# Patient Record
Sex: MALE | Race: WHITE | NOT HISPANIC OR LATINO | Employment: FULL TIME | URBAN - METROPOLITAN AREA
[De-identification: names, ages, dates, MRNs, and addresses within clinical notes are randomized per-mention and may not be internally consistent; named-entity substitution may affect disease eponyms.]

---

## 2021-01-29 ENCOUNTER — OFFICE VISIT (OUTPATIENT)
Dept: PRIMARY CARE CLINIC | Facility: CLINIC | Age: 21
End: 2021-01-29
Payer: COMMERCIAL

## 2021-01-29 ENCOUNTER — LAB VISIT (OUTPATIENT)
Dept: LAB | Facility: HOSPITAL | Age: 21
End: 2021-01-29
Attending: PHYSICIAN ASSISTANT
Payer: COMMERCIAL

## 2021-01-29 VITALS
HEART RATE: 81 BPM | WEIGHT: 175.5 LBS | DIASTOLIC BLOOD PRESSURE: 80 MMHG | SYSTOLIC BLOOD PRESSURE: 110 MMHG | TEMPERATURE: 98 F | OXYGEN SATURATION: 99 %

## 2021-01-29 DIAGNOSIS — Z00.00 WELLNESS EXAMINATION: ICD-10-CM

## 2021-01-29 DIAGNOSIS — F41.9 ANXIETY: Primary | ICD-10-CM

## 2021-01-29 DIAGNOSIS — F41.9 ANXIETY: ICD-10-CM

## 2021-01-29 DIAGNOSIS — R41.840 ATTENTION DEFICIT: ICD-10-CM

## 2021-01-29 LAB
BASOPHILS # BLD AUTO: 0.02 K/UL (ref 0–0.2)
BASOPHILS NFR BLD: 0.4 % (ref 0–1.9)
DIFFERENTIAL METHOD: NORMAL
EOSINOPHIL # BLD AUTO: 0 K/UL (ref 0–0.5)
EOSINOPHIL NFR BLD: 0.7 % (ref 0–8)
ERYTHROCYTE [DISTWIDTH] IN BLOOD BY AUTOMATED COUNT: 12.8 % (ref 11.5–14.5)
HCT VFR BLD AUTO: 43.9 % (ref 40–54)
HGB BLD-MCNC: 14.3 G/DL (ref 14–18)
IMM GRANULOCYTES # BLD AUTO: 0 K/UL (ref 0–0.04)
IMM GRANULOCYTES NFR BLD AUTO: 0 % (ref 0–0.5)
LYMPHOCYTES # BLD AUTO: 1.8 K/UL (ref 1–4.8)
LYMPHOCYTES NFR BLD: 33.7 % (ref 18–48)
MCH RBC QN AUTO: 30.6 PG (ref 27–31)
MCHC RBC AUTO-ENTMCNC: 32.6 G/DL (ref 32–36)
MCV RBC AUTO: 94 FL (ref 82–98)
MONOCYTES # BLD AUTO: 0.5 K/UL (ref 0.3–1)
MONOCYTES NFR BLD: 10 % (ref 4–15)
NEUTROPHILS # BLD AUTO: 3 K/UL (ref 1.8–7.7)
NEUTROPHILS NFR BLD: 55.2 % (ref 38–73)
NRBC BLD-RTO: 0 /100 WBC
PLATELET # BLD AUTO: 239 K/UL (ref 150–350)
PMV BLD AUTO: 10.8 FL (ref 9.2–12.9)
RBC # BLD AUTO: 4.68 M/UL (ref 4.6–6.2)
WBC # BLD AUTO: 5.4 K/UL (ref 3.9–12.7)

## 2021-01-29 PROCEDURE — 84443 ASSAY THYROID STIM HORMONE: CPT

## 2021-01-29 PROCEDURE — 80061 LIPID PANEL: CPT

## 2021-01-29 PROCEDURE — 86703 HIV-1/HIV-2 1 RESULT ANTBDY: CPT

## 2021-01-29 PROCEDURE — 99999 PR PBB SHADOW E&M-NEW PATIENT-LVL III: CPT | Mod: PBBFAC,,, | Performed by: PHYSICIAN ASSISTANT

## 2021-01-29 PROCEDURE — 86803 HEPATITIS C AB TEST: CPT

## 2021-01-29 PROCEDURE — 80053 COMPREHEN METABOLIC PANEL: CPT

## 2021-01-29 PROCEDURE — 99203 OFFICE O/P NEW LOW 30 MIN: CPT | Mod: S$GLB,,, | Performed by: PHYSICIAN ASSISTANT

## 2021-01-29 PROCEDURE — 1126F PR PAIN SEVERITY QUANTIFIED, NO PAIN PRESENT: ICD-10-PCS | Mod: S$GLB,,, | Performed by: PHYSICIAN ASSISTANT

## 2021-01-29 PROCEDURE — 85025 COMPLETE CBC W/AUTO DIFF WBC: CPT

## 2021-01-29 PROCEDURE — 99203 PR OFFICE/OUTPT VISIT, NEW, LEVL III, 30-44 MIN: ICD-10-PCS | Mod: S$GLB,,, | Performed by: PHYSICIAN ASSISTANT

## 2021-01-29 PROCEDURE — 36415 COLL VENOUS BLD VENIPUNCTURE: CPT | Mod: PN

## 2021-01-29 PROCEDURE — 1126F AMNT PAIN NOTED NONE PRSNT: CPT | Mod: S$GLB,,, | Performed by: PHYSICIAN ASSISTANT

## 2021-01-29 PROCEDURE — 99999 PR PBB SHADOW E&M-NEW PATIENT-LVL III: ICD-10-PCS | Mod: PBBFAC,,, | Performed by: PHYSICIAN ASSISTANT

## 2021-01-30 LAB
ALBUMIN SERPL BCP-MCNC: 4.6 G/DL (ref 3.5–5.2)
ALP SERPL-CCNC: 52 U/L (ref 55–135)
ALT SERPL W/O P-5'-P-CCNC: 14 U/L (ref 10–44)
ANION GAP SERPL CALC-SCNC: 8 MMOL/L (ref 8–16)
AST SERPL-CCNC: 23 U/L (ref 10–40)
BILIRUB SERPL-MCNC: 1.1 MG/DL (ref 0.1–1)
BUN SERPL-MCNC: 8 MG/DL (ref 6–20)
CALCIUM SERPL-MCNC: 9.6 MG/DL (ref 8.7–10.5)
CHLORIDE SERPL-SCNC: 106 MMOL/L (ref 95–110)
CHOLEST SERPL-MCNC: 129 MG/DL (ref 120–199)
CHOLEST/HDLC SERPL: 2.2 {RATIO} (ref 2–5)
CO2 SERPL-SCNC: 27 MMOL/L (ref 23–29)
CREAT SERPL-MCNC: 1.1 MG/DL (ref 0.5–1.4)
EST. GFR  (AFRICAN AMERICAN): >60 ML/MIN/1.73 M^2
EST. GFR  (NON AFRICAN AMERICAN): >60 ML/MIN/1.73 M^2
GLUCOSE SERPL-MCNC: 85 MG/DL (ref 70–110)
HDLC SERPL-MCNC: 59 MG/DL (ref 40–75)
HDLC SERPL: 45.7 % (ref 20–50)
LDLC SERPL CALC-MCNC: 58.6 MG/DL (ref 63–159)
NONHDLC SERPL-MCNC: 70 MG/DL
POTASSIUM SERPL-SCNC: 4.7 MMOL/L (ref 3.5–5.1)
PROT SERPL-MCNC: 7.2 G/DL (ref 6–8.4)
SODIUM SERPL-SCNC: 141 MMOL/L (ref 136–145)
TRIGL SERPL-MCNC: 57 MG/DL (ref 30–150)
TSH SERPL DL<=0.005 MIU/L-ACNC: 0.48 UIU/ML (ref 0.4–4)

## 2021-02-01 LAB
HCV AB SERPL QL IA: NEGATIVE
HIV 1+2 AB+HIV1 P24 AG SERPL QL IA: NEGATIVE

## 2021-02-03 ENCOUNTER — OFFICE VISIT (OUTPATIENT)
Dept: PRIMARY CARE CLINIC | Facility: CLINIC | Age: 21
End: 2021-02-03
Payer: COMMERCIAL

## 2021-02-03 DIAGNOSIS — R41.840 ATTENTION DEFICIT: Primary | ICD-10-CM

## 2021-02-03 DIAGNOSIS — F41.9 ANXIETY: ICD-10-CM

## 2021-02-03 PROCEDURE — 99214 PR OFFICE/OUTPT VISIT, EST, LEVL IV, 30-39 MIN: ICD-10-PCS | Mod: 95,,, | Performed by: FAMILY MEDICINE

## 2021-02-03 PROCEDURE — 99214 OFFICE O/P EST MOD 30 MIN: CPT | Mod: 95,,, | Performed by: FAMILY MEDICINE

## 2021-02-03 RX ORDER — METHYLPHENIDATE HYDROCHLORIDE 27 MG/1
27 TABLET ORAL EVERY MORNING
Qty: 30 TABLET | Refills: 0 | Status: SHIPPED | OUTPATIENT
Start: 2021-02-03 | End: 2021-02-11

## 2021-02-11 ENCOUNTER — OFFICE VISIT (OUTPATIENT)
Dept: PRIMARY CARE CLINIC | Facility: CLINIC | Age: 21
End: 2021-02-11
Payer: COMMERCIAL

## 2021-02-11 DIAGNOSIS — F41.9 ANXIETY: ICD-10-CM

## 2021-02-11 DIAGNOSIS — F98.8 ATTENTION DEFICIT DISORDER, UNSPECIFIED HYPERACTIVITY PRESENCE: Primary | ICD-10-CM

## 2021-02-11 PROCEDURE — 99214 PR OFFICE/OUTPT VISIT, EST, LEVL IV, 30-39 MIN: ICD-10-PCS | Mod: 95,,, | Performed by: FAMILY MEDICINE

## 2021-02-11 PROCEDURE — 99214 OFFICE O/P EST MOD 30 MIN: CPT | Mod: 95,,, | Performed by: FAMILY MEDICINE

## 2021-02-11 RX ORDER — METHYLPHENIDATE HYDROCHLORIDE 27 MG/1
27 TABLET ORAL 2 TIMES DAILY
Qty: 30 TABLET | Refills: 0
Start: 2021-02-11 | End: 2021-02-23

## 2021-02-23 RX ORDER — METHYLPHENIDATE HYDROCHLORIDE 27 MG/1
27 TABLET ORAL 2 TIMES DAILY
Qty: 30 TABLET | Refills: 0 | Status: CANCELLED
Start: 2021-02-23

## 2021-02-23 RX ORDER — METHYLPHENIDATE HYDROCHLORIDE 54 MG/1
54 TABLET ORAL EVERY MORNING
Qty: 30 TABLET | Refills: 0 | Status: SHIPPED | OUTPATIENT
Start: 2021-02-23 | End: 2021-03-18 | Stop reason: SDUPTHER

## 2021-03-03 ENCOUNTER — OFFICE VISIT (OUTPATIENT)
Dept: PRIMARY CARE CLINIC | Facility: CLINIC | Age: 21
End: 2021-03-03
Payer: COMMERCIAL

## 2021-03-03 DIAGNOSIS — R41.840 ATTENTION AND CONCENTRATION DEFICIT: Primary | ICD-10-CM

## 2021-03-03 DIAGNOSIS — F15.20 CAFFEINE ADDICTION: ICD-10-CM

## 2021-03-03 DIAGNOSIS — F41.9 ANXIETY: ICD-10-CM

## 2021-03-03 PROCEDURE — 99214 OFFICE O/P EST MOD 30 MIN: CPT | Mod: 95,,, | Performed by: FAMILY MEDICINE

## 2021-03-03 PROCEDURE — 99214 PR OFFICE/OUTPT VISIT, EST, LEVL IV, 30-39 MIN: ICD-10-PCS | Mod: 95,,, | Performed by: FAMILY MEDICINE

## 2021-03-18 ENCOUNTER — OFFICE VISIT (OUTPATIENT)
Dept: PRIMARY CARE CLINIC | Facility: CLINIC | Age: 21
End: 2021-03-18
Payer: COMMERCIAL

## 2021-03-18 DIAGNOSIS — R41.840 ATTENTION AND CONCENTRATION DEFICIT: Primary | ICD-10-CM

## 2021-03-18 PROCEDURE — 99214 OFFICE O/P EST MOD 30 MIN: CPT | Mod: 95,,, | Performed by: FAMILY MEDICINE

## 2021-03-18 PROCEDURE — 99214 PR OFFICE/OUTPT VISIT, EST, LEVL IV, 30-39 MIN: ICD-10-PCS | Mod: 95,,, | Performed by: FAMILY MEDICINE

## 2021-03-18 RX ORDER — METHYLPHENIDATE HYDROCHLORIDE 27 MG/1
27 TABLET ORAL DAILY
Qty: 30 TABLET | Refills: 0 | Status: SHIPPED | OUTPATIENT
Start: 2021-03-18 | End: 2021-04-16 | Stop reason: SDUPTHER

## 2021-03-18 RX ORDER — METHYLPHENIDATE HYDROCHLORIDE 54 MG/1
54 TABLET ORAL EVERY MORNING
Qty: 30 TABLET | Refills: 0 | Status: SHIPPED | OUTPATIENT
Start: 2021-03-18 | End: 2021-04-16 | Stop reason: SDUPTHER

## 2021-04-16 RX ORDER — METHYLPHENIDATE HYDROCHLORIDE 27 MG/1
27 TABLET ORAL DAILY
Qty: 30 TABLET | Refills: 0 | Status: SHIPPED | OUTPATIENT
Start: 2021-04-16 | End: 2021-05-19 | Stop reason: SDUPTHER

## 2021-04-16 RX ORDER — METHYLPHENIDATE HYDROCHLORIDE 54 MG/1
54 TABLET ORAL EVERY MORNING
Qty: 30 TABLET | Refills: 0 | Status: SHIPPED | OUTPATIENT
Start: 2021-04-16 | End: 2021-05-19 | Stop reason: SDUPTHER

## 2021-04-19 ENCOUNTER — OFFICE VISIT (OUTPATIENT)
Dept: PRIMARY CARE CLINIC | Facility: CLINIC | Age: 21
End: 2021-04-19
Payer: COMMERCIAL

## 2021-04-19 DIAGNOSIS — R41.840 ATTENTION AND CONCENTRATION DEFICIT: Primary | ICD-10-CM

## 2021-04-19 DIAGNOSIS — F41.9 ANXIETY: ICD-10-CM

## 2021-04-19 PROCEDURE — 99214 OFFICE O/P EST MOD 30 MIN: CPT | Mod: 95,,, | Performed by: FAMILY MEDICINE

## 2021-04-19 PROCEDURE — 99214 PR OFFICE/OUTPT VISIT, EST, LEVL IV, 30-39 MIN: ICD-10-PCS | Mod: 95,,, | Performed by: FAMILY MEDICINE

## 2021-04-19 RX ORDER — FLUOXETINE 10 MG/1
10 CAPSULE ORAL DAILY
Qty: 30 CAPSULE | Refills: 2 | Status: SHIPPED | OUTPATIENT
Start: 2021-04-19 | End: 2021-07-30 | Stop reason: SDUPTHER

## 2021-04-20 ENCOUNTER — PATIENT MESSAGE (OUTPATIENT)
Dept: PSYCHIATRY | Facility: CLINIC | Age: 21
End: 2021-04-20

## 2021-05-20 RX ORDER — METHYLPHENIDATE HYDROCHLORIDE 54 MG/1
54 TABLET ORAL EVERY MORNING
Qty: 30 TABLET | Refills: 0 | Status: SHIPPED | OUTPATIENT
Start: 2021-05-20 | End: 2021-06-24 | Stop reason: SDUPTHER

## 2021-05-20 RX ORDER — METHYLPHENIDATE HYDROCHLORIDE 27 MG/1
27 TABLET ORAL DAILY
Qty: 30 TABLET | Refills: 0 | Status: SHIPPED | OUTPATIENT
Start: 2021-05-20 | End: 2021-06-24 | Stop reason: SDUPTHER

## 2021-05-27 ENCOUNTER — OFFICE VISIT (OUTPATIENT)
Dept: PRIMARY CARE CLINIC | Facility: CLINIC | Age: 21
End: 2021-05-27
Payer: COMMERCIAL

## 2021-05-27 DIAGNOSIS — R41.840 ATTENTION AND CONCENTRATION DEFICIT: Primary | ICD-10-CM

## 2021-05-27 DIAGNOSIS — F41.1 GAD (GENERALIZED ANXIETY DISORDER): ICD-10-CM

## 2021-05-27 PROCEDURE — 99214 PR OFFICE/OUTPT VISIT, EST, LEVL IV, 30-39 MIN: ICD-10-PCS | Mod: 95,,, | Performed by: FAMILY MEDICINE

## 2021-05-27 PROCEDURE — 99214 OFFICE O/P EST MOD 30 MIN: CPT | Mod: 95,,, | Performed by: FAMILY MEDICINE

## 2021-06-24 RX ORDER — METHYLPHENIDATE HYDROCHLORIDE 54 MG/1
54 TABLET ORAL EVERY MORNING
Qty: 30 TABLET | Refills: 0 | Status: SHIPPED | OUTPATIENT
Start: 2021-06-24 | End: 2021-07-30 | Stop reason: SDUPTHER

## 2021-06-24 RX ORDER — METHYLPHENIDATE HYDROCHLORIDE 27 MG/1
27 TABLET ORAL DAILY
Qty: 30 TABLET | Refills: 0 | Status: SHIPPED | OUTPATIENT
Start: 2021-06-24 | End: 2021-07-30 | Stop reason: SDUPTHER

## 2021-07-30 ENCOUNTER — TELEPHONE (OUTPATIENT)
Dept: PRIMARY CARE CLINIC | Facility: CLINIC | Age: 21
End: 2021-07-30

## 2021-07-30 RX ORDER — METHYLPHENIDATE HYDROCHLORIDE 54 MG/1
54 TABLET ORAL EVERY MORNING
Qty: 30 TABLET | Refills: 0 | Status: SHIPPED | OUTPATIENT
Start: 2021-07-30 | End: 2021-09-09 | Stop reason: SDUPTHER

## 2021-07-30 RX ORDER — FLUOXETINE 10 MG/1
10 CAPSULE ORAL DAILY
Qty: 30 CAPSULE | Refills: 2 | Status: SHIPPED | OUTPATIENT
Start: 2021-07-30 | End: 2021-09-09

## 2021-07-30 RX ORDER — METHYLPHENIDATE HYDROCHLORIDE 27 MG/1
27 TABLET ORAL DAILY
Qty: 30 TABLET | Refills: 0 | Status: SHIPPED | OUTPATIENT
Start: 2021-07-30 | End: 2021-09-09 | Stop reason: SDUPTHER

## 2021-09-09 ENCOUNTER — OFFICE VISIT (OUTPATIENT)
Dept: PRIMARY CARE CLINIC | Facility: CLINIC | Age: 21
End: 2021-09-09
Payer: COMMERCIAL

## 2021-09-09 VITALS — DIASTOLIC BLOOD PRESSURE: 60 MMHG | SYSTOLIC BLOOD PRESSURE: 119 MMHG

## 2021-09-09 DIAGNOSIS — F41.1 GAD (GENERALIZED ANXIETY DISORDER): ICD-10-CM

## 2021-09-09 DIAGNOSIS — R41.840 ATTENTION AND CONCENTRATION DEFICIT: Primary | ICD-10-CM

## 2021-09-09 DIAGNOSIS — R45.84 ANHEDONIA: ICD-10-CM

## 2021-09-09 PROCEDURE — 3078F PR MOST RECENT DIASTOLIC BLOOD PRESSURE < 80 MM HG: ICD-10-PCS | Mod: CPTII,,, | Performed by: FAMILY MEDICINE

## 2021-09-09 PROCEDURE — 1160F PR REVIEW ALL MEDS BY PRESCRIBER/CLIN PHARMACIST DOCUMENTED: ICD-10-PCS | Mod: CPTII,,, | Performed by: FAMILY MEDICINE

## 2021-09-09 PROCEDURE — 99214 PR OFFICE/OUTPT VISIT, EST, LEVL IV, 30-39 MIN: ICD-10-PCS | Mod: 95,,, | Performed by: FAMILY MEDICINE

## 2021-09-09 PROCEDURE — 99214 OFFICE O/P EST MOD 30 MIN: CPT | Mod: 95,,, | Performed by: FAMILY MEDICINE

## 2021-09-09 PROCEDURE — 1159F MED LIST DOCD IN RCRD: CPT | Mod: CPTII,,, | Performed by: FAMILY MEDICINE

## 2021-09-09 PROCEDURE — 3078F DIAST BP <80 MM HG: CPT | Mod: CPTII,,, | Performed by: FAMILY MEDICINE

## 2021-09-09 PROCEDURE — 3074F SYST BP LT 130 MM HG: CPT | Mod: CPTII,,, | Performed by: FAMILY MEDICINE

## 2021-09-09 PROCEDURE — 1160F RVW MEDS BY RX/DR IN RCRD: CPT | Mod: CPTII,,, | Performed by: FAMILY MEDICINE

## 2021-09-09 PROCEDURE — 3074F PR MOST RECENT SYSTOLIC BLOOD PRESSURE < 130 MM HG: ICD-10-PCS | Mod: CPTII,,, | Performed by: FAMILY MEDICINE

## 2021-09-09 PROCEDURE — 1159F PR MEDICATION LIST DOCUMENTED IN MEDICAL RECORD: ICD-10-PCS | Mod: CPTII,,, | Performed by: FAMILY MEDICINE

## 2021-09-09 RX ORDER — METHYLPHENIDATE HYDROCHLORIDE 54 MG/1
54 TABLET ORAL EVERY MORNING
Qty: 30 TABLET | Refills: 0 | Status: SHIPPED | OUTPATIENT
Start: 2021-09-09 | End: 2021-10-11 | Stop reason: SDUPTHER

## 2021-09-09 RX ORDER — METHYLPHENIDATE HYDROCHLORIDE 27 MG/1
27 TABLET ORAL DAILY
Qty: 30 TABLET | Refills: 0 | Status: SHIPPED | OUTPATIENT
Start: 2021-09-09 | End: 2021-10-11 | Stop reason: SDUPTHER

## 2021-09-09 RX ORDER — SERTRALINE HYDROCHLORIDE 25 MG/1
25 TABLET, FILM COATED ORAL DAILY
Qty: 90 TABLET | Refills: 3 | Status: SHIPPED | OUTPATIENT
Start: 2021-09-09 | End: 2022-03-23

## 2021-10-12 RX ORDER — METHYLPHENIDATE HYDROCHLORIDE 27 MG/1
27 TABLET ORAL DAILY
Qty: 30 TABLET | Refills: 0 | Status: SHIPPED | OUTPATIENT
Start: 2021-10-12 | End: 2021-11-08 | Stop reason: SDUPTHER

## 2021-10-12 RX ORDER — METHYLPHENIDATE HYDROCHLORIDE 54 MG/1
54 TABLET ORAL EVERY MORNING
Qty: 30 TABLET | Refills: 0 | Status: SHIPPED | OUTPATIENT
Start: 2021-10-12 | End: 2021-11-08 | Stop reason: SDUPTHER

## 2021-11-08 RX ORDER — METHYLPHENIDATE HYDROCHLORIDE 27 MG/1
27 TABLET ORAL DAILY
Qty: 30 TABLET | Refills: 0 | Status: SHIPPED | OUTPATIENT
Start: 2021-11-08 | End: 2021-12-15 | Stop reason: SDUPTHER

## 2021-11-08 RX ORDER — METHYLPHENIDATE HYDROCHLORIDE 54 MG/1
54 TABLET ORAL EVERY MORNING
Qty: 30 TABLET | Refills: 0 | Status: SHIPPED | OUTPATIENT
Start: 2021-11-08 | End: 2021-12-15 | Stop reason: SDUPTHER

## 2021-12-09 RX ORDER — METHYLPHENIDATE HYDROCHLORIDE 54 MG/1
54 TABLET ORAL EVERY MORNING
Qty: 30 TABLET | Refills: 0 | OUTPATIENT
Start: 2021-12-09

## 2021-12-09 RX ORDER — METHYLPHENIDATE HYDROCHLORIDE 27 MG/1
27 TABLET ORAL DAILY
Qty: 30 TABLET | Refills: 0 | OUTPATIENT
Start: 2021-12-09

## 2021-12-14 RX ORDER — METHYLPHENIDATE HYDROCHLORIDE 27 MG/1
27 TABLET ORAL DAILY
Qty: 30 TABLET | Refills: 0 | OUTPATIENT
Start: 2021-12-14

## 2021-12-14 RX ORDER — METHYLPHENIDATE HYDROCHLORIDE 54 MG/1
54 TABLET ORAL EVERY MORNING
Qty: 30 TABLET | Refills: 0 | OUTPATIENT
Start: 2021-12-14

## 2021-12-15 ENCOUNTER — OFFICE VISIT (OUTPATIENT)
Dept: PRIMARY CARE CLINIC | Facility: CLINIC | Age: 21
End: 2021-12-15
Payer: COMMERCIAL

## 2021-12-15 ENCOUNTER — PATIENT MESSAGE (OUTPATIENT)
Dept: PRIMARY CARE CLINIC | Facility: CLINIC | Age: 21
End: 2021-12-15

## 2021-12-15 VITALS — SYSTOLIC BLOOD PRESSURE: 126 MMHG | DIASTOLIC BLOOD PRESSURE: 60 MMHG

## 2021-12-15 DIAGNOSIS — F41.1 GAD (GENERALIZED ANXIETY DISORDER): ICD-10-CM

## 2021-12-15 DIAGNOSIS — F32.A DEPRESSION, UNSPECIFIED DEPRESSION TYPE: ICD-10-CM

## 2021-12-15 DIAGNOSIS — R41.840 ATTENTION AND CONCENTRATION DEFICIT: Primary | ICD-10-CM

## 2021-12-15 DIAGNOSIS — Z00.00 ROUTINE GENERAL MEDICAL EXAMINATION AT A HEALTH CARE FACILITY: ICD-10-CM

## 2021-12-15 PROCEDURE — 99214 OFFICE O/P EST MOD 30 MIN: CPT | Mod: 95,,, | Performed by: FAMILY MEDICINE

## 2021-12-15 PROCEDURE — 99214 PR OFFICE/OUTPT VISIT, EST, LEVL IV, 30-39 MIN: ICD-10-PCS | Mod: 95,,, | Performed by: FAMILY MEDICINE

## 2021-12-15 RX ORDER — METHYLPHENIDATE HYDROCHLORIDE 54 MG/1
54 TABLET ORAL EVERY MORNING
Qty: 30 TABLET | Refills: 0 | Status: SHIPPED | OUTPATIENT
Start: 2021-12-15 | End: 2022-01-13 | Stop reason: SDUPTHER

## 2021-12-15 RX ORDER — METHYLPHENIDATE HYDROCHLORIDE 27 MG/1
27 TABLET ORAL DAILY
Qty: 30 TABLET | Refills: 0 | Status: CANCELLED | OUTPATIENT
Start: 2021-12-15

## 2021-12-15 RX ORDER — METHYLPHENIDATE HYDROCHLORIDE 27 MG/1
27 TABLET ORAL DAILY
Qty: 30 TABLET | Refills: 0 | Status: SHIPPED | OUTPATIENT
Start: 2021-12-15 | End: 2022-01-13 | Stop reason: SDUPTHER

## 2021-12-15 RX ORDER — METHYLPHENIDATE HYDROCHLORIDE 54 MG/1
54 TABLET ORAL EVERY MORNING
Qty: 30 TABLET | Refills: 0 | Status: CANCELLED | OUTPATIENT
Start: 2021-12-15

## 2022-01-13 RX ORDER — METHYLPHENIDATE HYDROCHLORIDE 54 MG/1
54 TABLET ORAL EVERY MORNING
Qty: 30 TABLET | Refills: 0 | Status: SHIPPED | OUTPATIENT
Start: 2022-01-13 | End: 2022-02-14 | Stop reason: SDUPTHER

## 2022-01-13 RX ORDER — METHYLPHENIDATE HYDROCHLORIDE 27 MG/1
27 TABLET ORAL DAILY
Qty: 30 TABLET | Refills: 0 | Status: SHIPPED | OUTPATIENT
Start: 2022-01-13 | End: 2022-02-14 | Stop reason: SDUPTHER

## 2022-01-13 NOTE — TELEPHONE ENCOUNTER
No new care gaps identified.  Powered by [employer]. Reference number: [id number].   1/13/2022 9:02:23 AM CST

## 2022-02-14 RX ORDER — METHYLPHENIDATE HYDROCHLORIDE 54 MG/1
54 TABLET ORAL EVERY MORNING
Qty: 30 TABLET | Refills: 0 | Status: SHIPPED | OUTPATIENT
Start: 2022-02-14 | End: 2022-03-23 | Stop reason: SDUPTHER

## 2022-02-14 RX ORDER — METHYLPHENIDATE HYDROCHLORIDE 27 MG/1
27 TABLET ORAL DAILY
Qty: 30 TABLET | Refills: 0 | Status: SHIPPED | OUTPATIENT
Start: 2022-02-14 | End: 2022-03-23 | Stop reason: SDUPTHER

## 2022-02-14 NOTE — TELEPHONE ENCOUNTER
No new care gaps identified.  Powered by [employer]. Reference number: [id number].   2/14/2022 11:23:19 AM CST

## 2022-03-09 ENCOUNTER — LAB VISIT (OUTPATIENT)
Dept: LAB | Facility: HOSPITAL | Age: 22
End: 2022-03-09
Attending: FAMILY MEDICINE
Payer: COMMERCIAL

## 2022-03-09 DIAGNOSIS — Z00.00 ROUTINE GENERAL MEDICAL EXAMINATION AT A HEALTH CARE FACILITY: ICD-10-CM

## 2022-03-09 LAB
ALBUMIN SERPL BCP-MCNC: 4.3 G/DL (ref 3.5–5.2)
ALP SERPL-CCNC: 53 U/L (ref 55–135)
ALT SERPL W/O P-5'-P-CCNC: 37 U/L (ref 10–44)
ANION GAP SERPL CALC-SCNC: 13 MMOL/L (ref 8–16)
AST SERPL-CCNC: 28 U/L (ref 10–40)
BILIRUB SERPL-MCNC: 0.7 MG/DL (ref 0.1–1)
BUN SERPL-MCNC: 8 MG/DL (ref 6–20)
CALCIUM SERPL-MCNC: 9.4 MG/DL (ref 8.7–10.5)
CHLORIDE SERPL-SCNC: 101 MMOL/L (ref 95–110)
CHOLEST SERPL-MCNC: 153 MG/DL (ref 120–199)
CHOLEST/HDLC SERPL: 2.8 {RATIO} (ref 2–5)
CO2 SERPL-SCNC: 26 MMOL/L (ref 23–29)
CREAT SERPL-MCNC: 1.1 MG/DL (ref 0.5–1.4)
EST. GFR  (AFRICAN AMERICAN): >60 ML/MIN/1.73 M^2
EST. GFR  (NON AFRICAN AMERICAN): >60 ML/MIN/1.73 M^2
GLUCOSE SERPL-MCNC: 92 MG/DL (ref 70–110)
HDLC SERPL-MCNC: 55 MG/DL (ref 40–75)
HDLC SERPL: 35.9 % (ref 20–50)
LDLC SERPL CALC-MCNC: 67.8 MG/DL (ref 63–159)
NONHDLC SERPL-MCNC: 98 MG/DL
POTASSIUM SERPL-SCNC: 4.1 MMOL/L (ref 3.5–5.1)
PROT SERPL-MCNC: 7.2 G/DL (ref 6–8.4)
SODIUM SERPL-SCNC: 140 MMOL/L (ref 136–145)
TRIGL SERPL-MCNC: 151 MG/DL (ref 30–150)

## 2022-03-09 PROCEDURE — 36415 COLL VENOUS BLD VENIPUNCTURE: CPT | Mod: PN | Performed by: FAMILY MEDICINE

## 2022-03-09 PROCEDURE — 80053 COMPREHEN METABOLIC PANEL: CPT | Performed by: FAMILY MEDICINE

## 2022-03-09 PROCEDURE — 80061 LIPID PANEL: CPT | Performed by: FAMILY MEDICINE

## 2022-03-17 NOTE — PROGRESS NOTES
Ginger,     Your lab results are within recommended goals for your age and conditions. No change in therapy is needed. Please let me know if you have further questions.    Sincerely,   Abby Woodall MD

## 2022-03-23 ENCOUNTER — OFFICE VISIT (OUTPATIENT)
Dept: PRIMARY CARE CLINIC | Facility: CLINIC | Age: 22
End: 2022-03-23
Payer: COMMERCIAL

## 2022-03-23 VITALS
HEART RATE: 89 BPM | WEIGHT: 221.25 LBS | TEMPERATURE: 98 F | OXYGEN SATURATION: 99 % | DIASTOLIC BLOOD PRESSURE: 70 MMHG | BODY MASS INDEX: 29.97 KG/M2 | RESPIRATION RATE: 18 BRPM | HEIGHT: 72 IN | SYSTOLIC BLOOD PRESSURE: 120 MMHG

## 2022-03-23 DIAGNOSIS — Z00.00 ROUTINE GENERAL MEDICAL EXAMINATION AT A HEALTH CARE FACILITY: Primary | ICD-10-CM

## 2022-03-23 DIAGNOSIS — F32.A DEPRESSION, UNSPECIFIED DEPRESSION TYPE: ICD-10-CM

## 2022-03-23 DIAGNOSIS — F41.1 GAD (GENERALIZED ANXIETY DISORDER): ICD-10-CM

## 2022-03-23 DIAGNOSIS — R41.840 ATTENTION AND CONCENTRATION DEFICIT: ICD-10-CM

## 2022-03-23 PROCEDURE — 99395 PREV VISIT EST AGE 18-39: CPT | Mod: S$GLB,,, | Performed by: FAMILY MEDICINE

## 2022-03-23 PROCEDURE — 1159F PR MEDICATION LIST DOCUMENTED IN MEDICAL RECORD: ICD-10-PCS | Mod: CPTII,S$GLB,, | Performed by: FAMILY MEDICINE

## 2022-03-23 PROCEDURE — 99999 PR PBB SHADOW E&M-EST. PATIENT-LVL III: ICD-10-PCS | Mod: PBBFAC,,, | Performed by: FAMILY MEDICINE

## 2022-03-23 PROCEDURE — 99395 PR PREVENTIVE VISIT,EST,18-39: ICD-10-PCS | Mod: S$GLB,,, | Performed by: FAMILY MEDICINE

## 2022-03-23 PROCEDURE — 93010 EKG 12-LEAD: ICD-10-PCS | Mod: S$GLB,,, | Performed by: INTERNAL MEDICINE

## 2022-03-23 PROCEDURE — 3074F SYST BP LT 130 MM HG: CPT | Mod: CPTII,S$GLB,, | Performed by: FAMILY MEDICINE

## 2022-03-23 PROCEDURE — 3078F DIAST BP <80 MM HG: CPT | Mod: CPTII,S$GLB,, | Performed by: FAMILY MEDICINE

## 2022-03-23 PROCEDURE — 93005 ELECTROCARDIOGRAM TRACING: CPT | Mod: S$GLB,,, | Performed by: FAMILY MEDICINE

## 2022-03-23 PROCEDURE — 1159F MED LIST DOCD IN RCRD: CPT | Mod: CPTII,S$GLB,, | Performed by: FAMILY MEDICINE

## 2022-03-23 PROCEDURE — 3074F PR MOST RECENT SYSTOLIC BLOOD PRESSURE < 130 MM HG: ICD-10-PCS | Mod: CPTII,S$GLB,, | Performed by: FAMILY MEDICINE

## 2022-03-23 PROCEDURE — 93005 EKG 12-LEAD: ICD-10-PCS | Mod: S$GLB,,, | Performed by: FAMILY MEDICINE

## 2022-03-23 PROCEDURE — 93010 ELECTROCARDIOGRAM REPORT: CPT | Mod: S$GLB,,, | Performed by: INTERNAL MEDICINE

## 2022-03-23 PROCEDURE — 99999 PR PBB SHADOW E&M-EST. PATIENT-LVL III: CPT | Mod: PBBFAC,,, | Performed by: FAMILY MEDICINE

## 2022-03-23 PROCEDURE — 3008F BODY MASS INDEX DOCD: CPT | Mod: CPTII,S$GLB,, | Performed by: FAMILY MEDICINE

## 2022-03-23 PROCEDURE — 3008F PR BODY MASS INDEX (BMI) DOCUMENTED: ICD-10-PCS | Mod: CPTII,S$GLB,, | Performed by: FAMILY MEDICINE

## 2022-03-23 PROCEDURE — 3078F PR MOST RECENT DIASTOLIC BLOOD PRESSURE < 80 MM HG: ICD-10-PCS | Mod: CPTII,S$GLB,, | Performed by: FAMILY MEDICINE

## 2022-03-23 RX ORDER — METHYLPHENIDATE HYDROCHLORIDE 54 MG/1
54 TABLET ORAL EVERY MORNING
Qty: 30 TABLET | Refills: 0 | Status: SHIPPED | OUTPATIENT
Start: 2022-03-23 | End: 2022-04-26 | Stop reason: SDUPTHER

## 2022-03-23 RX ORDER — SERTRALINE HYDROCHLORIDE 50 MG/1
50 TABLET, FILM COATED ORAL DAILY
Qty: 90 TABLET | Refills: 3 | Status: SHIPPED | OUTPATIENT
Start: 2022-03-23 | End: 2023-02-14 | Stop reason: SDUPTHER

## 2022-03-23 RX ORDER — METHYLPHENIDATE HYDROCHLORIDE 27 MG/1
27 TABLET ORAL DAILY
Qty: 30 TABLET | Refills: 0 | Status: SHIPPED | OUTPATIENT
Start: 2022-03-23 | End: 2022-04-26 | Stop reason: SDUPTHER

## 2022-03-23 NOTE — PROGRESS NOTES
Subjective:      Patient ID: Ginger Matamoros is a 21 y.o. male.    Chief Complaint: Annual Exam (Yearly visit)    Disclaimer:  This note is prepared using voice recognition software and as such is likely to have errors and has not been proof read. Please contact me for questions.     Ginger Matamoros is a 21 y.o. male who presents to clinic for ADD/anxiety.   Had a lot more stress, and a busier time of year. Feel sbetter than 1 yr ago.   Everyday there is something different.   No chest pains.   Sometimes with working out with get some chest tightness with lifting weights.   If skips  A day of sertraline will not be a pleasant person the day after. Went through a break up and made him sad.     No results found for: HGBA1C   Lab Results       Component                Value               Date                       CHOL                     153                 03/09/2022                 CHOL                     129                 01/29/2021            Lab Results       Component                Value               Date                       LDLCALC                  67.8                03/09/2022                 LDLCALC                  58.6 (L)            01/29/2021              Wt Readings from Last 10 Encounters:  03/23/22 : 100.4 kg (221 lb 3.7 oz)  01/29/21 : 79.6 kg (175 lb 7.8 oz)      The ASCVD Risk score (Waterfordlinda MANZANO Jr., et al., 2013) failed to calculate for the following reasons:    The 2013 ASCVD risk score is only valid for ages 40 to 79    :   03/04/2022 02/14/2022   1  Methylphenidate Er 27 Mg Tab   30.00  30  Me Lov  [id number]  Och ([id number])  0   [place]    02/14/2022 02/14/2022   1  Methylphenidate Er 54 Mg Tab   30.00  30  Me Lov  [id number]  Och ([id number])  [place]   [place]    02/01/2022 01/13/2022   1  Methylphenidate Er 27 Mg Tab   30.00  30  Me Lov  [phone]  Och ([id number])  [place]   [place]    01/13/2022 01/13/2022   1  Methylphenidate Er 54 Mg Tab   30.00  30  [facility]  [phone]  Och ([id number])   0   [place]    12/17/2021  12/15/2021   1  Methylphenidate Er 54 Mg Tab   30.00  30  Me Lov  [phone]  Och ([id number])  0   [place]    12/15/2021  12/15/2021   1  Methylphenidate Er 27 Mg Tab   30.00  30  Me Lov  [id number]  Och ([id number])  0   [place]    11/16/2021 11/08/2021   1  Methylphenidate Er 54 Mg Tab   30.00  30  Me Lov  [id number]  Och ([id number])  0   [place]    11/11/2021 11/08/2021   1  Methylphenidate Er 27 Mg Tab   30.00  30  Me Lov  [phone]  Och ([id number])  0   Comm Malinda LUNA         Has a birthday yesterday. Is working currently during the visit. At [employer].   Adhd- is doing well. Didn't take meds today because out of them and did need visit.   Does like the sertraline to help with the anxiety. Handles things better as well.   No ha, no cp, did have sore throat, cough and mucous production last week with loss of voice at [employer] but better now.   No fever.   Plans on getting flu and covid vaccines soon.     BP: 126/60      reviewed.   Past Medical History:  Brady, ADHD (attention deficit hyperactivity disorder)        Lab Results   Component Value Date    WBC 5.40 01/29/2021    HGB 14.3 01/29/2021    HCT 43.9 01/29/2021     01/29/2021    CHOL 153 03/09/2022    TRIG 151 (H) 03/09/2022    HDL 55 03/09/2022    ALT 37 03/09/2022    AST 28 03/09/2022     03/09/2022    K 4.1 03/09/2022     03/09/2022    CREATININE 1.1 03/09/2022    BUN 8 03/09/2022    CO2 26 03/09/2022    TSH 0.478 01/29/2021       No image results found.        Review of Systems   Constitutional: Negative for activity change and unexpected weight change.   HENT: Negative for hearing loss, rhinorrhea and trouble swallowing.    Eyes: Negative for discharge and visual disturbance.   Respiratory: Negative for chest tightness and wheezing.    Cardiovascular: Negative for chest pain and palpitations.   Gastrointestinal: Negative for blood in stool, constipation, diarrhea and vomiting.    Endocrine: Negative for polydipsia and polyuria.   Genitourinary: Negative for difficulty urinating, hematuria and urgency.   Musculoskeletal: Negative for arthralgias, joint swelling and neck pain.   Neurological: Negative for weakness and headaches.   Psychiatric/Behavioral: Negative for confusion and dysphoric mood.     Objective:     Vitals:    03/23/22 1420   BP: 120/70   Pulse: 89   Resp: 18   Temp: 98 °F (36.7 °C)   TempSrc: Temporal   SpO2: 99%   Weight: 100.4 kg (221 lb 3.7 oz)   Height: 6' (1.829 m)     Physical Exam  Vitals reviewed.   Constitutional:       General: He is not in acute distress.     Appearance: Normal appearance. He is well-developed and normal weight.   HENT:      Head: Normocephalic and atraumatic.      Right Ear: Tympanic membrane and external ear normal.      Left Ear: Tympanic membrane and external ear normal.      Nose: Nose normal.      Mouth/Throat:      Mouth: Mucous membranes are moist.      Pharynx: Oropharynx is clear.   Eyes:      Conjunctiva/sclera: Conjunctivae normal.      Pupils: Pupils are equal, round, and reactive to light.   Neck:      Thyroid: No thyromegaly.   Cardiovascular:      Rate and Rhythm: Normal rate and regular rhythm.      Heart sounds: No murmur heard.    No friction rub. No gallop.   Pulmonary:      Effort: Pulmonary effort is normal. No respiratory distress.      Breath sounds: Normal breath sounds.   Abdominal:      General: Bowel sounds are normal. There is no distension.      Palpations: Abdomen is soft.      Tenderness: There is no abdominal tenderness. There is no rebound.   Musculoskeletal:         General: Normal range of motion.      Cervical back: Normal range of motion and neck supple.   Lymphadenopathy:      Cervical: No cervical adenopathy.   Skin:     General: Skin is warm and dry.   Neurological:      General: No focal deficit present.      Mental Status: He is alert and oriented to person, place, and time.      Coordination:  Coordination normal.   Psychiatric:         Attention and Perception: Attention normal.         Mood and Affect: Mood and affect normal.         Speech: Speech normal.         Behavior: Behavior normal.         Thought Content: Thought content normal.         Cognition and Memory: Cognition normal.         Judgment: Judgment normal.       Assessment:     1. Routine general medical examination at a health care facility    2. Attention and concentration deficit    3. MEKA (generalized anxiety disorder)    4. Depression, unspecified depression type      Plan:   Ginger was seen today for annual exam.    Diagnoses and all orders for this visit:    Routine general medical examination at a health care facility - labs ordered. Discussed Health Maintenance issues.       Attention and concentration deficit- ekg today  reviewed. See note, refilled meds.   -     EKG 12-lead    MEKA (generalized anxiety disorder)- not controlled, worse, increase sertraline from 25 to 50mg daily. followup in 3 months virtually.     Depression, unspecified depression type- stable      Other orders  -     sertraline (ZOLOFT) 50 MG tablet; Take 1 tablet (50 mg total) by mouth once daily.  -     methylphenidate HCl 54 MG CR tablet; Take 1 tablet (54 mg total) by mouth every morning.  -     methylphenidate HCl 27 MG CR tablet; Take 1 tablet (27 mg total) by mouth once daily. Mid-day dosing for extending dosing for long work days            Follow up in about 3 months (around 6/23/2022) for f/u Virtual Visit, f/u Virtual for ADD.    There are no Patient Instructions on file for this visit.

## 2022-04-26 RX ORDER — METHYLPHENIDATE HYDROCHLORIDE 54 MG/1
54 TABLET ORAL EVERY MORNING
Qty: 30 TABLET | Refills: 0 | Status: SHIPPED | OUTPATIENT
Start: 2022-04-26 | End: 2022-05-31 | Stop reason: SDUPTHER

## 2022-04-26 RX ORDER — METHYLPHENIDATE HYDROCHLORIDE 27 MG/1
27 TABLET ORAL DAILY
Qty: 30 TABLET | Refills: 0 | Status: SHIPPED | OUTPATIENT
Start: 2022-04-26 | End: 2022-05-31 | Stop reason: SDUPTHER

## 2022-04-26 NOTE — TELEPHONE ENCOUNTER
No new care gaps identified.  Powered by [employer]. Reference number: [id number].   4/26/2022 9:13:17 AM CDT

## 2022-05-31 RX ORDER — METHYLPHENIDATE HYDROCHLORIDE 27 MG/1
27 TABLET ORAL DAILY
Qty: 30 TABLET | Refills: 0 | Status: SHIPPED | OUTPATIENT
Start: 2022-05-31 | End: 2022-06-23 | Stop reason: SDUPTHER

## 2022-05-31 RX ORDER — METHYLPHENIDATE HYDROCHLORIDE 54 MG/1
54 TABLET ORAL EVERY MORNING
Qty: 30 TABLET | Refills: 0 | Status: SHIPPED | OUTPATIENT
Start: 2022-05-31 | End: 2022-06-23 | Stop reason: SDUPTHER

## 2022-05-31 NOTE — TELEPHONE ENCOUNTER
No new care gaps identified.  [facility] Embedded Care Gaps. Reference number: [id number]. 5/31/2022   3:04:33 PM CDT

## 2022-06-23 ENCOUNTER — OFFICE VISIT (OUTPATIENT)
Dept: PRIMARY CARE CLINIC | Facility: CLINIC | Age: 22
End: 2022-06-23
Payer: COMMERCIAL

## 2022-06-23 DIAGNOSIS — F41.1 GAD (GENERALIZED ANXIETY DISORDER): Primary | ICD-10-CM

## 2022-06-23 DIAGNOSIS — R41.840 ATTENTION AND CONCENTRATION DEFICIT: ICD-10-CM

## 2022-06-23 DIAGNOSIS — F32.A DEPRESSION, UNSPECIFIED DEPRESSION TYPE: ICD-10-CM

## 2022-06-23 PROCEDURE — 1159F PR MEDICATION LIST DOCUMENTED IN MEDICAL RECORD: ICD-10-PCS | Mod: CPTII,95,, | Performed by: FAMILY MEDICINE

## 2022-06-23 PROCEDURE — 1160F PR REVIEW ALL MEDS BY PRESCRIBER/CLIN PHARMACIST DOCUMENTED: ICD-10-PCS | Mod: CPTII,95,, | Performed by: FAMILY MEDICINE

## 2022-06-23 PROCEDURE — 1160F RVW MEDS BY RX/DR IN RCRD: CPT | Mod: CPTII,95,, | Performed by: FAMILY MEDICINE

## 2022-06-23 PROCEDURE — 1159F MED LIST DOCD IN RCRD: CPT | Mod: CPTII,95,, | Performed by: FAMILY MEDICINE

## 2022-06-23 PROCEDURE — 99214 PR OFFICE/OUTPT VISIT, EST, LEVL IV, 30-39 MIN: ICD-10-PCS | Mod: 95,,, | Performed by: FAMILY MEDICINE

## 2022-06-23 PROCEDURE — 99214 OFFICE O/P EST MOD 30 MIN: CPT | Mod: 95,,, | Performed by: FAMILY MEDICINE

## 2022-06-23 RX ORDER — METHYLPHENIDATE HYDROCHLORIDE 54 MG/1
54 TABLET ORAL EVERY MORNING
Qty: 30 TABLET | Refills: 0 | Status: SHIPPED | OUTPATIENT
Start: 2022-06-29 | End: 2022-07-26 | Stop reason: SDUPTHER

## 2022-06-23 RX ORDER — METHYLPHENIDATE HYDROCHLORIDE 27 MG/1
27 TABLET ORAL DAILY
Qty: 30 TABLET | Refills: 0 | Status: SHIPPED | OUTPATIENT
Start: 2022-06-29 | End: 2022-07-26 | Stop reason: SDUPTHER

## 2022-06-23 NOTE — PROGRESS NOTES
Subjective:      Patient ID: Ginger Matamoros is a 21 y.o. male.    Chief Complaint: Follow-up    Disclaimer:  This note is prepared using voice recognition software and as such is likely to have errors and has not been proof read. Please contact me for questions.     The patient location is: work   The chief complaint leading to consultation is: mychart request     Visit type: video and audio simultaneous    Face to Face time with patient: 340pm -350pm       minutes of total time spent on the encounter, which includes face to face time and non-face to face time preparing to see the patient (eg, review of tests), Obtaining and/or reviewing separately obtained history, Documenting clinical information in the electronic or other health record, Independently interpreting results (not separately reported) and communicating results to the patient/family/caregiver, or Care coordination (not separately reported).     Each patient to whom he or she provides medical services by telemedicine is:  (1) informed of the relationship between the physician and patient and the respective role of any other health care provider with respect to management of the patient; and (2) notified that he or she may decline to receive medical services by telemedicine and may withdraw from such care at any time.    Ginger Matamoros is a 21 y.o. male who presents to clinic for ADD/anxiety.   Feels a lot better after increasing the meds.  Of zoloft to 50mg.   Had a lot more stress, and a busier time of year. Feels better than 1 yr ago.   No chest pains.   works. At [employer].   Does like the sertraline to help with the anxiety. Handles things better as well.   No ha, no cp,    reviewed.   Past Medical History:  Brady, ADHD (attention deficit hyperactivity disorder)      No results found for: HGBA1C   Lab Results   Component Value Date    CHOL 153 03/09/2022    CHOL 129 01/29/2021     Lab Results   Component Value Date    LDLCALC 67.8  03/09/2022    LDLCALC 58.6 (L) 01/29/2021       Wt Readings from Last 10 Encounters:   03/23/22 100.4 kg (221 lb 3.7 oz)   01/29/21 79.6 kg (175 lb 7.8 oz)       The ASCVD Risk score (Joo MANZANO Jr., et al., 2013) failed to calculate for the following reasons:    The 2013 ASCVD risk score is only valid for ages 40 to 79        Lab Results   Component Value Date    WBC 5.40 01/29/2021    HGB 14.3 01/29/2021    HCT 43.9 01/29/2021     01/29/2021    CHOL 153 03/09/2022    TRIG 151 (H) 03/09/2022    HDL 55 03/09/2022    ALT 37 03/09/2022    AST 28 03/09/2022     03/09/2022    K 4.1 03/09/2022     03/09/2022    CREATININE 1.1 03/09/2022    BUN 8 03/09/2022    CO2 26 03/09/2022    TSH 0.478 01/29/2021       No image results found.        Review of Systems   Constitutional: Negative for activity change and unexpected weight change.   HENT: Negative for hearing loss, rhinorrhea and trouble swallowing.    Eyes: Negative for discharge and visual disturbance.   Respiratory: Negative for chest tightness and wheezing.    Cardiovascular: Negative for chest pain and palpitations.   Gastrointestinal: Negative for blood in stool, constipation, diarrhea and vomiting.   Endocrine: Negative for polydipsia and polyuria.   Genitourinary: Negative for difficulty urinating, hematuria and urgency.   Musculoskeletal: Negative for arthralgias, joint swelling and neck pain.   Neurological: Negative for weakness and headaches.   Psychiatric/Behavioral: Negative for confusion and dysphoric mood.     Objective:   There were no vitals filed for this visit.  Physical Exam  Vitals reviewed.   Constitutional:       General: He is awake. He is not in acute distress.     Appearance: Normal appearance. He is well-developed, well-groomed and normal weight. He is not ill-appearing.   HENT:      Head: Normocephalic and atraumatic.      Right Ear: External ear normal.      Left Ear: External ear normal.      Nose: Nose normal.       Mouth/Throat:      Lips: Pink.   Eyes:      Conjunctiva/sclera: Conjunctivae normal.   Pulmonary:      Effort: Pulmonary effort is normal.   Neurological:      Mental Status: He is alert.   Psychiatric:         Attention and Perception: Attention and perception normal. He is attentive.         Mood and Affect: Mood and affect normal. Mood is not anxious or depressed. Affect is not labile, blunt, angry or inappropriate.         Speech: Speech normal. He is communicative. Speech is not rapid and pressured, delayed, slurred or tangential.         Behavior: Behavior normal. Behavior is not agitated, slowed, aggressive, withdrawn, hyperactive or combative. Behavior is cooperative.         Thought Content: Thought content normal. Thought content is not paranoid or delusional. Thought content does not include homicidal or suicidal ideation. Thought content does not include homicidal or suicidal plan.         Cognition and Memory: Cognition and memory normal. Memory is not impaired. He does not exhibit impaired recent memory or impaired remote memory.         Judgment: Judgment normal. Judgment is not impulsive or inappropriate.       Assessment:     1. MEKA (generalized anxiety disorder)    2. Attention and concentration deficit    3. Depression, unspecified depression type      Plan:   Ginger was seen today for follow-up.    Diagnoses and all orders for this visit:    MEKA (generalized anxiety disorder)  Comments:  improved with sertraline 50mg, cont with dosing.     Attention and concentration deficit  Comments:  stable with meds. doing well. no ha, no cp, no mood swings, no bp issues. sent in refills of concerta. f/u 3 mo    Depression, unspecified depression type  Comments:  improved  with sertraline at 50mg dose.     Other orders  -     methylphenidate HCl 54 MG CR tablet; Take 1 tablet (54 mg total) by mouth every morning.  -     methylphenidate HCl 27 MG CR tablet; Take 1 tablet (27 mg total) by mouth once daily.  Mid-day dosing for extending dosing for long work days            Follow up in about 3 months (around 9/23/2022) for f/u Virtual for ADD/ Annette or Abby .    There are no Patient Instructions on file for this visit.

## 2022-07-27 RX ORDER — METHYLPHENIDATE HYDROCHLORIDE 54 MG/1
54 TABLET ORAL EVERY MORNING
Qty: 30 TABLET | Refills: 0 | Status: SHIPPED | OUTPATIENT
Start: 2022-07-27 | End: 2022-09-06 | Stop reason: SDUPTHER

## 2022-07-27 RX ORDER — METHYLPHENIDATE HYDROCHLORIDE 27 MG/1
27 TABLET ORAL DAILY
Qty: 30 TABLET | Refills: 0 | Status: SHIPPED | OUTPATIENT
Start: 2022-07-27 | End: 2022-09-06 | Stop reason: SDUPTHER

## 2022-07-27 NOTE — TELEPHONE ENCOUNTER
No new care gaps identified.  [facility] Embedded Care Gaps. Reference number: [id number]. 7/26/2022   9:10:54 PM CDT

## 2022-09-06 RX ORDER — METHYLPHENIDATE HYDROCHLORIDE 54 MG/1
54 TABLET ORAL EVERY MORNING
Qty: 30 TABLET | Refills: 0 | Status: SHIPPED | OUTPATIENT
Start: 2022-09-06 | End: 2022-10-06 | Stop reason: SDUPTHER

## 2022-09-06 RX ORDER — METHYLPHENIDATE HYDROCHLORIDE 27 MG/1
27 TABLET ORAL DAILY
Qty: 30 TABLET | Refills: 0 | Status: SHIPPED | OUTPATIENT
Start: 2022-09-06 | End: 2022-10-06 | Stop reason: SDUPTHER

## 2022-09-06 NOTE — TELEPHONE ENCOUNTER
Please inform to the patient that 1 prescription was sent and an appointment needs to be made to avoid delays in ADD medication refills for future. Needs to be q 3mo, can be with Abby or Annette. Can send them a scheduling ticket. Thanks. Abby Woodall MD

## 2022-09-06 NOTE — TELEPHONE ENCOUNTER
No new care gaps identified.  [facility] Embedded Care Gaps. Reference number: [id number]. 9/06/2022   10:04:08 AM CATIET

## 2022-10-06 NOTE — TELEPHONE ENCOUNTER
No new care gaps identified.  [facility] Embedded Care Gaps. Reference number: [id number]. 10/06/2022   3:56:40 PM CDT

## 2022-10-07 RX ORDER — METHYLPHENIDATE HYDROCHLORIDE 54 MG/1
54 TABLET ORAL EVERY MORNING
Qty: 30 TABLET | Refills: 0 | Status: SHIPPED | OUTPATIENT
Start: 2022-10-07 | End: 2022-11-15 | Stop reason: SDUPTHER

## 2022-10-07 RX ORDER — METHYLPHENIDATE HYDROCHLORIDE 27 MG/1
27 TABLET ORAL DAILY
Qty: 30 TABLET | Refills: 0 | Status: SHIPPED | OUTPATIENT
Start: 2022-10-07 | End: 2022-11-15 | Stop reason: SDUPTHER

## 2022-10-25 ENCOUNTER — PATIENT MESSAGE (OUTPATIENT)
Dept: PRIMARY CARE CLINIC | Facility: CLINIC | Age: 22
End: 2022-10-25

## 2022-10-25 ENCOUNTER — OFFICE VISIT (OUTPATIENT)
Dept: PRIMARY CARE CLINIC | Facility: CLINIC | Age: 22
End: 2022-10-25
Payer: COMMERCIAL

## 2022-10-25 VITALS — DIASTOLIC BLOOD PRESSURE: 80 MMHG | SYSTOLIC BLOOD PRESSURE: 120 MMHG

## 2022-10-25 DIAGNOSIS — F41.1 GAD (GENERALIZED ANXIETY DISORDER): ICD-10-CM

## 2022-10-25 DIAGNOSIS — R41.840 ATTENTION AND CONCENTRATION DEFICIT: Primary | ICD-10-CM

## 2022-10-25 DIAGNOSIS — F32.A DEPRESSION, UNSPECIFIED DEPRESSION TYPE: ICD-10-CM

## 2022-10-25 PROCEDURE — 1159F PR MEDICATION LIST DOCUMENTED IN MEDICAL RECORD: ICD-10-PCS | Mod: CPTII,95,, | Performed by: FAMILY MEDICINE

## 2022-10-25 PROCEDURE — 99214 OFFICE O/P EST MOD 30 MIN: CPT | Mod: 95,,, | Performed by: FAMILY MEDICINE

## 2022-10-25 PROCEDURE — 1160F RVW MEDS BY RX/DR IN RCRD: CPT | Mod: CPTII,95,, | Performed by: FAMILY MEDICINE

## 2022-10-25 PROCEDURE — 3079F DIAST BP 80-89 MM HG: CPT | Mod: CPTII,95,, | Performed by: FAMILY MEDICINE

## 2022-10-25 PROCEDURE — 1159F MED LIST DOCD IN RCRD: CPT | Mod: CPTII,95,, | Performed by: FAMILY MEDICINE

## 2022-10-25 PROCEDURE — 3074F PR MOST RECENT SYSTOLIC BLOOD PRESSURE < 130 MM HG: ICD-10-PCS | Mod: CPTII,95,, | Performed by: FAMILY MEDICINE

## 2022-10-25 PROCEDURE — 99214 PR OFFICE/OUTPT VISIT, EST, LEVL IV, 30-39 MIN: ICD-10-PCS | Mod: 95,,, | Performed by: FAMILY MEDICINE

## 2022-10-25 PROCEDURE — 1160F PR REVIEW ALL MEDS BY PRESCRIBER/CLIN PHARMACIST DOCUMENTED: ICD-10-PCS | Mod: CPTII,95,, | Performed by: FAMILY MEDICINE

## 2022-10-25 PROCEDURE — 3074F SYST BP LT 130 MM HG: CPT | Mod: CPTII,95,, | Performed by: FAMILY MEDICINE

## 2022-10-25 PROCEDURE — 3079F PR MOST RECENT DIASTOLIC BLOOD PRESSURE 80-89 MM HG: ICD-10-PCS | Mod: CPTII,95,, | Performed by: FAMILY MEDICINE

## 2022-10-25 NOTE — PROGRESS NOTES
Subjective:      Patient ID: Ginger Matamoros is a 21 y.o. male.    Chief Complaint: ADD    Disclaimer:  This note is prepared using voice recognition software and as such is likely to have errors and has not been proof read. Please contact me for questions.     [facility] Reason For Visit    10/25/2022  2:56 PM CDT - Filed by Patient   What is your primary reason for visit? Other/Annual   Have you experienced any of the following:   Change in activity? No   Unexpected weight change? No   Neck pain? No   Hearing loss? No   Runny nose? No   Trouble swallowing? No   Eye discharge? No   Changes in vision? No   Chest tightness? No   Wheezing? No   Chest pain? No   Heart beating fast or racing? No   Blood in stool? No   Constipation? No   Vomiting? No   Diarrhea? No   Drinking much more than usual? No   Urinating much more than usual? No   Difficulty urinating? No   Have a sudden urge to urinate? No   Blood in the urine? No   Joint swelling? No   Joint pain? No   Headaches? No   Weakness? No   Confusion? No   Feeling depressed? No     [facility] Peq Documents    10/25/2022  2:57 PM CDT - Filed by Patient   Would you like a copy of [facility] Financial [facility] Policy Summary? No, I would not like a copy.   Would you like to receive more information regarding your rights and protections under the [place] Billing act? No, I would not.       The patient location is: work   The chief complaint leading to consultation is: mychart request     Visit type: video and audio simultaneous    Face to Face time with patient: 320pm -328pm      15  minutes of total time spent on the encounter, which includes face to face time and non-face to face time preparing to see the patient (eg, review of tests), Obtaining and/or reviewing separately obtained history, Documenting clinical information in the electronic or other health record, Independently interpreting results (not separately reported) and communicating results to the  patient/family/caregiver, or Care coordination (not separately reported).     Each patient to whom he or she provides medical services by telemedicine is:  (1) informed of the relationship between the physician and patient and the respective role of any other health care provider with respect to management of the patient; and (2) notified that he or she may decline to receive medical services by telemedicine and may withdraw from such care at any time.    Ginger Matamoros is a 21 y.o. male who presents to clinic for ADD/anxiety.   Feels a lot better after increasing the meds.  Of zoloft to 50mg. Can tell a big improvement with anxiety.   Stress is still present.    At work today.   Thinks things are better.   works [employer].   Does like the sertraline to help with the anxiety. Handles things better as well.     On Concerta 54 and 27mg but not taking every day. Has plenty.   No ha, no cp,    reviewed.   Past Medical History:  Brady, ADHD (attention deficit hyperactivity disorder)            No results found for: HGBA1C   Lab Results   Component Value Date    CHOL 153 03/09/2022    CHOL 129 01/29/2021     Lab Results   Component Value Date    LDLCALC 67.8 03/09/2022    LDLCALC 58.6 (L) 01/29/2021       Wt Readings from Last 10 Encounters:   03/23/22 100.4 kg (221 lb 3.7 oz)   01/29/21 79.6 kg (175 lb 7.8 oz)       The ASCVD Risk score (Edward DK, et al., 2019) failed to calculate for the following reasons:    The 2019 ASCVD risk score is only valid for ages 40 to 79  Lab Results   Component Value Date    WBC 5.40 01/29/2021    HGB 14.3 01/29/2021    HCT 43.9 01/29/2021     01/29/2021    CHOL 153 03/09/2022    TRIG 151 (H) 03/09/2022    HDL 55 03/09/2022    ALT 37 03/09/2022    AST 28 03/09/2022     03/09/2022    K 4.1 03/09/2022     03/09/2022    CREATININE 1.1 03/09/2022    BUN 8 03/09/2022    CO2 26 03/09/2022    TSH 0.478 01/29/2021       No image results found.        Review of  Systems   Constitutional:  Negative for activity change and unexpected weight change.   HENT:  Negative for hearing loss, rhinorrhea and trouble swallowing.    Eyes:  Negative for discharge and visual disturbance.   Respiratory:  Negative for chest tightness and wheezing.    Cardiovascular:  Negative for chest pain and palpitations.   Gastrointestinal:  Negative for blood in stool, constipation, diarrhea and vomiting.   Endocrine: Negative for polydipsia and polyuria.   Genitourinary:  Negative for difficulty urinating, hematuria and urgency.   Musculoskeletal:  Negative for arthralgias, joint swelling and neck pain.   Neurological:  Negative for weakness and headaches.   Psychiatric/Behavioral:  Negative for confusion and dysphoric mood.    Objective:     Vitals:    10/25/22 1526   BP: 120/80     Physical Exam  Vitals reviewed.   Constitutional:       General: He is awake. He is not in acute distress.     Appearance: Normal appearance. He is well-developed, well-groomed and normal weight. He is not ill-appearing.   HENT:      Head: Normocephalic and atraumatic.      Right Ear: External ear normal.      Left Ear: External ear normal.      Nose: Nose normal.      Mouth/Throat:      Lips: Pink.   Eyes:      Conjunctiva/sclera: Conjunctivae normal.   Pulmonary:      Effort: Pulmonary effort is normal.   Neurological:      Mental Status: He is alert.   Psychiatric:         Attention and Perception: Attention and perception normal. He is attentive.         Mood and Affect: Mood and affect normal. Mood is not anxious or depressed. Affect is not labile, blunt, angry or inappropriate.         Speech: Speech normal. He is communicative. Speech is not rapid and pressured, delayed, slurred or tangential.         Behavior: Behavior normal. Behavior is not agitated, slowed, aggressive, withdrawn, hyperactive or combative. Behavior is cooperative.         Thought Content: Thought content normal. Thought content is not paranoid or  delusional. Thought content does not include homicidal or suicidal ideation. Thought content does not include homicidal or suicidal plan.         Cognition and Memory: Cognition and memory normal. Memory is not impaired. He does not exhibit impaired recent memory or impaired remote memory.         Judgment: Judgment normal. Judgment is not impulsive or inappropriate.     Assessment:     1. Attention and concentration deficit    2. MEKA (generalized anxiety disorder)    3. Depression, unspecified depression type      Plan:   Ginger was seen today for add.    Diagnoses and all orders for this visit:    Attention and concentration deficit- - stable, Continue with current medications and interventions.  reviewed.       MEKA (generalized anxiety disorder) - stable, Continue with current medications and interventions. Labs reviewed.       Depression, unspecified depression type- stable, Continue with current medications and interventions. Labs reviewed.         Health Maintenance Due   Topic Date Due    COVID-19 Vaccine (1) Never done    Pneumococcal Vaccines (Age 0-64) (1 - PCV) Never done    HPV Vaccines (1 - Male 2-dose series) Never done    TETANUS VACCINE  Never done    Influenza Vaccine (1) Never done       Follow up in about 3 months (around 1/25/2023) for f/u Virtual for ADD.    There are no Patient Instructions on file for this visit.

## 2022-11-15 NOTE — TELEPHONE ENCOUNTER
No new care gaps identified.  [facility] Embedded Care Gaps. Reference number: [id number]. 11/15/2022   4:35:54 PM CST

## 2022-11-17 ENCOUNTER — PATIENT MESSAGE (OUTPATIENT)
Dept: PRIMARY CARE CLINIC | Facility: CLINIC | Age: 22
End: 2022-11-17
Payer: COMMERCIAL

## 2022-11-17 RX ORDER — METHYLPHENIDATE HYDROCHLORIDE 27 MG/1
27 TABLET ORAL DAILY
Qty: 30 TABLET | Refills: 0 | Status: SHIPPED | OUTPATIENT
Start: 2022-11-17 | End: 2022-12-16 | Stop reason: SDUPTHER

## 2022-11-17 RX ORDER — METHYLPHENIDATE HYDROCHLORIDE 54 MG/1
54 TABLET ORAL EVERY MORNING
Qty: 30 TABLET | Refills: 0 | Status: SHIPPED | OUTPATIENT
Start: 2022-11-17 | End: 2022-12-16 | Stop reason: SDUPTHER

## 2022-12-16 NOTE — TELEPHONE ENCOUNTER
No new care gaps identified.  [facility] Embedded Care Gaps. Reference number: [id number]. 12/16/2022   10:21:42 AM CST

## 2022-12-19 RX ORDER — METHYLPHENIDATE HYDROCHLORIDE 27 MG/1
27 TABLET ORAL DAILY
Qty: 30 TABLET | Refills: 0 | Status: SHIPPED | OUTPATIENT
Start: 2022-12-19 | End: 2023-01-10 | Stop reason: SDUPTHER

## 2022-12-19 RX ORDER — METHYLPHENIDATE HYDROCHLORIDE 54 MG/1
54 TABLET ORAL EVERY MORNING
Qty: 30 TABLET | Refills: 0 | Status: SHIPPED | OUTPATIENT
Start: 2022-12-19 | End: 2023-01-10 | Stop reason: SDUPTHER

## 2023-01-10 RX ORDER — METHYLPHENIDATE HYDROCHLORIDE 27 MG/1
27 TABLET ORAL DAILY
Qty: 30 TABLET | Refills: 0 | Status: SHIPPED | OUTPATIENT
Start: 2023-01-10 | End: 2023-02-14 | Stop reason: SDUPTHER

## 2023-01-10 RX ORDER — METHYLPHENIDATE HYDROCHLORIDE 54 MG/1
54 TABLET ORAL EVERY MORNING
Qty: 30 TABLET | Refills: 0 | Status: SHIPPED | OUTPATIENT
Start: 2023-01-10 | End: 2023-02-14 | Stop reason: SDUPTHER

## 2023-01-10 NOTE — TELEPHONE ENCOUNTER
No new care gaps identified.  [facility] Embedded Care Gaps. Reference number: [id number]. 1/10/2023   11:37:55 AM CST

## 2023-01-25 ENCOUNTER — PATIENT MESSAGE (OUTPATIENT)
Dept: PRIMARY CARE CLINIC | Facility: CLINIC | Age: 23
End: 2023-01-25
Payer: COMMERCIAL

## 2023-02-14 ENCOUNTER — TELEPHONE (OUTPATIENT)
Dept: PRIMARY CARE CLINIC | Facility: CLINIC | Age: 23
End: 2023-02-14
Payer: COMMERCIAL

## 2023-02-14 ENCOUNTER — OFFICE VISIT (OUTPATIENT)
Dept: PRIMARY CARE CLINIC | Facility: CLINIC | Age: 23
End: 2023-02-14
Payer: COMMERCIAL

## 2023-02-14 VITALS
DIASTOLIC BLOOD PRESSURE: 80 MMHG | HEIGHT: 72 IN | BODY MASS INDEX: 29.93 KG/M2 | WEIGHT: 221 LBS | SYSTOLIC BLOOD PRESSURE: 125 MMHG

## 2023-02-14 DIAGNOSIS — R41.840 ATTENTION AND CONCENTRATION DEFICIT: Primary | ICD-10-CM

## 2023-02-14 DIAGNOSIS — F41.1 GAD (GENERALIZED ANXIETY DISORDER): ICD-10-CM

## 2023-02-14 DIAGNOSIS — Z00.00 ANNUAL PHYSICAL EXAM: Primary | ICD-10-CM

## 2023-02-14 DIAGNOSIS — F32.A DEPRESSION, UNSPECIFIED DEPRESSION TYPE: ICD-10-CM

## 2023-02-14 PROCEDURE — 3074F PR MOST RECENT SYSTOLIC BLOOD PRESSURE < 130 MM HG: ICD-10-PCS | Mod: CPTII,95,, | Performed by: FAMILY MEDICINE

## 2023-02-14 PROCEDURE — 3008F PR BODY MASS INDEX (BMI) DOCUMENTED: ICD-10-PCS | Mod: CPTII,95,, | Performed by: FAMILY MEDICINE

## 2023-02-14 PROCEDURE — 3079F DIAST BP 80-89 MM HG: CPT | Mod: CPTII,95,, | Performed by: FAMILY MEDICINE

## 2023-02-14 PROCEDURE — 1160F RVW MEDS BY RX/DR IN RCRD: CPT | Mod: CPTII,95,, | Performed by: FAMILY MEDICINE

## 2023-02-14 PROCEDURE — 1159F MED LIST DOCD IN RCRD: CPT | Mod: CPTII,95,, | Performed by: FAMILY MEDICINE

## 2023-02-14 PROCEDURE — 3079F PR MOST RECENT DIASTOLIC BLOOD PRESSURE 80-89 MM HG: ICD-10-PCS | Mod: CPTII,95,, | Performed by: FAMILY MEDICINE

## 2023-02-14 PROCEDURE — 1160F PR REVIEW ALL MEDS BY PRESCRIBER/CLIN PHARMACIST DOCUMENTED: ICD-10-PCS | Mod: CPTII,95,, | Performed by: FAMILY MEDICINE

## 2023-02-14 PROCEDURE — 1159F PR MEDICATION LIST DOCUMENTED IN MEDICAL RECORD: ICD-10-PCS | Mod: CPTII,95,, | Performed by: FAMILY MEDICINE

## 2023-02-14 PROCEDURE — 99214 OFFICE O/P EST MOD 30 MIN: CPT | Mod: 95,,, | Performed by: FAMILY MEDICINE

## 2023-02-14 PROCEDURE — 3008F BODY MASS INDEX DOCD: CPT | Mod: CPTII,95,, | Performed by: FAMILY MEDICINE

## 2023-02-14 PROCEDURE — 99214 PR OFFICE/OUTPT VISIT, EST, LEVL IV, 30-39 MIN: ICD-10-PCS | Mod: 95,,, | Performed by: FAMILY MEDICINE

## 2023-02-14 PROCEDURE — 3074F SYST BP LT 130 MM HG: CPT | Mod: CPTII,95,, | Performed by: FAMILY MEDICINE

## 2023-02-14 RX ORDER — SERTRALINE HYDROCHLORIDE 50 MG/1
50 TABLET, FILM COATED ORAL DAILY
Qty: 90 TABLET | Refills: 3 | Status: SHIPPED | OUTPATIENT
Start: 2023-02-14 | End: 2023-06-05 | Stop reason: SDUPTHER

## 2023-02-14 RX ORDER — METHYLPHENIDATE HYDROCHLORIDE 27 MG/1
27 TABLET ORAL DAILY
Qty: 30 TABLET | Refills: 0 | Status: SHIPPED | OUTPATIENT
Start: 2023-02-14 | End: 2023-03-20 | Stop reason: SDUPTHER

## 2023-02-14 RX ORDER — METHYLPHENIDATE HYDROCHLORIDE 54 MG/1
54 TABLET ORAL EVERY MORNING
Qty: 30 TABLET | Refills: 0 | Status: SHIPPED | OUTPATIENT
Start: 2023-02-14 | End: 2023-03-20 | Stop reason: SDUPTHER

## 2023-02-14 NOTE — TELEPHONE ENCOUNTER
Patient information was verified and phone number. Patient advise everything in the system is correct not sure why his mail is being return

## 2023-02-14 NOTE — PROGRESS NOTES
Subjective:      Patient ID: Ginger Matamoros is a 22 y.o. male.    Chief Complaint: Follow-up and Medication Refill    The patient location is: home  Visit type: video and audio simultaneous    Patient is a 22 y.o. male coming in today  has a past medical history of ADHD (attention deficit hyperactivity disorder).    Pt presents via telemedicine for ADHD f/u.  Currently on Methylphenidate and Zoloft. Has not had issues filling out his medication. He is requiring medication refills. Has been monitoring BP closely; reports BP is stable.     [facility] Reason For Visit    2/14/2023  2:26 PM CST - Filed by Patient   What is your primary reason for visit? Other/Annual   Have you experienced any of the following:   Change in activity?     Unexpected weight change?     Neck pain?     Hearing loss?     Runny nose?     Trouble swallowing?     Eye discharge?     Changes in vision?     Chest tightness?     Wheezing?     Chest pain?     Heart beating fast or racing?     Blood in stool?     Constipation?     Vomiting?     Diarrhea?     Drinking much more than usual?     Urinating much more than usual?     Difficulty urinating?     Have a sudden urge to urinate?     Blood in the urine?     Joint swelling?     Joint pain?     Headaches?     Weakness?     Confusion?     Feeling depressed?       [facility] Peq Documents    2/14/2023  2:27 PM CST - Filed by Patient   Would you like a copy of [facility] Financial Assistance Policy Summary? No, I would not like a copy.   Would you like to receive more information regarding your rights and protections under the [place] Billing act? No, I would not.         Pmh, Psh, Family Hx, Social Hx updated in Epic Tabs today.     LABS:   Lab Results   Component Value Date    WBC 5.40 01/29/2021    HGB 14.3 01/29/2021    HCT 43.9 01/29/2021     01/29/2021    CHOL 153 03/09/2022    TRIG 151 (H) 03/09/2022    HDL 55 03/09/2022    ALT 37 03/09/2022    AST 28 03/09/2022     03/09/2022    K 4.1  03/09/2022     03/09/2022    CREATININE 1.1 03/09/2022    BUN 8 03/09/2022    CO2 26 03/09/2022    TSH 0.478 01/29/2021       No image results found.        Review of Systems   Constitutional:  Negative for activity change and unexpected weight change.   HENT:  Negative for hearing loss, rhinorrhea and trouble swallowing.    Eyes:  Negative for discharge and visual disturbance.   Respiratory:  Negative for chest tightness and wheezing.    Cardiovascular:  Negative for chest pain and palpitations.   Gastrointestinal:  Negative for blood in stool, constipation, diarrhea and vomiting.   Endocrine: Negative for polydipsia and polyuria.   Genitourinary:  Negative for difficulty urinating, hematuria and urgency.   Musculoskeletal:  Negative for arthralgias, joint swelling and neck pain.   Neurological:  Negative for weakness and headaches.   Psychiatric/Behavioral:  Negative for confusion and dysphoric mood.    Objective:     Vitals:    02/14/23 1545   BP: 125/80   Weight: 100.2 kg (221 lb)   Height: 6' (1.829 m)     Wt Readings from Last 10 Encounters:   02/14/23 100.2 kg (221 lb)   03/23/22 100.4 kg (221 lb 3.7 oz)   01/29/21 79.6 kg (175 lb 7.8 oz)     Physical Exam  Vitals reviewed.   Constitutional:       General: He is awake. He is not in acute distress.     Appearance: Normal appearance. He is well-developed, well-groomed and overweight. He is not ill-appearing.   HENT:      Head: Normocephalic and atraumatic.      Right Ear: External ear normal.      Left Ear: External ear normal.      Nose: Nose normal.      Mouth/Throat:      Lips: Pink.   Eyes:      Conjunctiva/sclera: Conjunctivae normal.   Pulmonary:      Effort: Pulmonary effort is normal.   Neurological:      Mental Status: He is alert.   Psychiatric:         Attention and Perception: Attention and perception normal. He is attentive.         Mood and Affect: Mood and affect normal. Mood is not anxious or depressed. Affect is not labile, blunt, angry  or inappropriate.         Speech: Speech normal. He is communicative. Speech is not rapid and pressured, delayed, slurred or tangential.         Behavior: Behavior normal. Behavior is not agitated, slowed, aggressive, withdrawn, hyperactive or combative. Behavior is cooperative.         Thought Content: Thought content normal. Thought content is not paranoid or delusional. Thought content does not include homicidal or suicidal ideation. Thought content does not include homicidal or suicidal plan.         Cognition and Memory: Cognition and memory normal. Memory is not impaired. He does not exhibit impaired recent memory or impaired remote memory.         Judgment: Judgment normal. Judgment is not impulsive or inappropriate.     Assessment:     1. Attention and concentration deficit    2. MEKA (generalized anxiety disorder)    3. Depression, unspecified depression type      Plan:   Ginger was seen today for follow-up and medication refill.    Diagnoses and all orders for this visit:    Attention and concentration deficit  -     methylphenidate HCl 27 MG CR tablet; Take 1 tablet (27 mg total) by mouth once daily. Mid-day dosing for extending dosing for long work days  -     methylphenidate HCl 54 MG CR tablet; Take 1 tablet (54 mg total) by mouth every morning.    MEKA (generalized anxiety disorder)  -     sertraline (ZOLOFT) 50 MG tablet; Take 1 tablet (50 mg total) by mouth once daily.    Depression, unspecified depression type  -     sertraline (ZOLOFT) 50 MG tablet; Take 1 tablet (50 mg total) by mouth once daily.      Rx refill Methylphenidate 54 mg/day for ADHD treatment.  Rx refill Zoloft 50 mg/day for mood disorder treatment.  - stable, Continue with current medications and interventions.  reviewed.   Instructed to f/u in 3 months.     Face to Face time with patient: 3:58 PM-4:12 PM          20  minutes of total time spent on the encounter, which includes face to face time and non-face to face time preparing  to see the patient (eg, review of tests), Obtaining and/or reviewing separately obtained history, Documenting clinical information in the electronic or other health record, Independently interpreting results (not separately reported) and communicating results to the patient/family/caregiver, or Care coordination (not separately reported).     Each patient to whom he or she provides medical services by telemedicine is:  (1) informed of the relationship between the physician and patient and the respective role of any other health care provider with respect to management of the patient; and (2) notified that he or she may decline to receive medical services by telemedicine and may withdraw from such care at any time.      Follow up in about 3 months (around 5/14/2023) for physical with Dr GUAMAN.    There are no Patient Instructions on file for this visit.    Scribe Attestation:   I, Zoran Blas, am scribing for, and in the presence of, Dr. Abby Guaman MD. I performed the above scribed service and the documentation accurately describes the services I performed. I attest to the accuracy of the note.    I, Dr. Abby Guaman MD, reviewed documentation as scribed above. I personally performed the services described in this documentation.  I agree that the record reflects my personal performance and is accurate and complete. Abby Guaman MD.    02/14/2023

## 2023-03-20 DIAGNOSIS — R41.840 ATTENTION AND CONCENTRATION DEFICIT: ICD-10-CM

## 2023-03-21 RX ORDER — METHYLPHENIDATE HYDROCHLORIDE 54 MG/1
54 TABLET ORAL EVERY MORNING
Qty: 30 TABLET | Refills: 0 | Status: SHIPPED | OUTPATIENT
Start: 2023-03-21 | End: 2023-04-20 | Stop reason: SDUPTHER

## 2023-03-21 RX ORDER — METHYLPHENIDATE HYDROCHLORIDE 27 MG/1
27 TABLET ORAL DAILY
Qty: 30 TABLET | Refills: 0 | Status: SHIPPED | OUTPATIENT
Start: 2023-03-21 | End: 2023-04-20 | Stop reason: SDUPTHER

## 2023-03-21 NOTE — TELEPHONE ENCOUNTER
Refill Routing Note   Medication(s) are not appropriate for processing by [facility] for the following reason(s):       Medication outside of protocol    [facility] action(s):  Route         Appointments  past 12m or future 3m with PCP    Date Provider   Last Visit   2/14/2023 Abby Woodall MD   Next Visit   6/5/2023 Abby Woodall MD   ED visits in past 90 days: 0        Note composed:7:02 AM 03/21/2023

## 2023-03-21 NOTE — TELEPHONE ENCOUNTER
No new care gaps identified.  [facility] Embedded Care Gaps. Reference number: [id number]. 3/20/2023   7:15:06 PM CDT

## 2023-04-20 DIAGNOSIS — R41.840 ATTENTION AND CONCENTRATION DEFICIT: ICD-10-CM

## 2023-04-20 RX ORDER — METHYLPHENIDATE HYDROCHLORIDE 54 MG/1
54 TABLET ORAL EVERY MORNING
Qty: 30 TABLET | Refills: 0 | Status: SHIPPED | OUTPATIENT
Start: 2023-04-20 | End: 2023-05-22 | Stop reason: SDUPTHER

## 2023-04-20 RX ORDER — METHYLPHENIDATE HYDROCHLORIDE 27 MG/1
27 TABLET ORAL DAILY
Qty: 30 TABLET | Refills: 0 | Status: SHIPPED | OUTPATIENT
Start: 2023-04-20 | End: 2023-06-05 | Stop reason: SDUPTHER

## 2023-04-20 NOTE — TELEPHONE ENCOUNTER
No new care gaps identified.  [facility] Embedded Care Gaps. Reference number: [id number]. 4/20/2023   12:17:16 PM CDT

## 2023-05-22 DIAGNOSIS — R41.840 ATTENTION AND CONCENTRATION DEFICIT: ICD-10-CM

## 2023-05-22 RX ORDER — METHYLPHENIDATE HYDROCHLORIDE 54 MG/1
54 TABLET ORAL EVERY MORNING
Qty: 30 TABLET | Refills: 0 | Status: SHIPPED | OUTPATIENT
Start: 2023-05-22 | End: 2023-06-05 | Stop reason: SDUPTHER

## 2023-05-22 NOTE — TELEPHONE ENCOUNTER
No care due was identified.  [facility] Embedded Care Due Messages. Reference number: [id number].   5/22/2023 10:53:01 AM CDT

## 2023-06-05 ENCOUNTER — OFFICE VISIT (OUTPATIENT)
Dept: PRIMARY CARE CLINIC | Facility: CLINIC | Age: 23
End: 2023-06-05
Payer: COMMERCIAL

## 2023-06-05 VITALS
DIASTOLIC BLOOD PRESSURE: 78 MMHG | OXYGEN SATURATION: 98 % | BODY MASS INDEX: 33.25 KG/M2 | SYSTOLIC BLOOD PRESSURE: 120 MMHG | HEART RATE: 76 BPM | WEIGHT: 245.5 LBS | HEIGHT: 72 IN

## 2023-06-05 DIAGNOSIS — Z00.00 ROUTINE GENERAL MEDICAL EXAMINATION AT A HEALTH CARE FACILITY: Primary | ICD-10-CM

## 2023-06-05 DIAGNOSIS — F32.A DEPRESSION, UNSPECIFIED DEPRESSION TYPE: ICD-10-CM

## 2023-06-05 DIAGNOSIS — F41.1 GAD (GENERALIZED ANXIETY DISORDER): ICD-10-CM

## 2023-06-05 DIAGNOSIS — R41.840 ATTENTION AND CONCENTRATION DEFICIT: ICD-10-CM

## 2023-06-05 PROCEDURE — 3078F DIAST BP <80 MM HG: CPT | Mod: CPTII,S$GLB,, | Performed by: FAMILY MEDICINE

## 2023-06-05 PROCEDURE — 3074F PR MOST RECENT SYSTOLIC BLOOD PRESSURE < 130 MM HG: ICD-10-PCS | Mod: CPTII,S$GLB,, | Performed by: FAMILY MEDICINE

## 2023-06-05 PROCEDURE — 3078F PR MOST RECENT DIASTOLIC BLOOD PRESSURE < 80 MM HG: ICD-10-PCS | Mod: CPTII,S$GLB,, | Performed by: FAMILY MEDICINE

## 2023-06-05 PROCEDURE — 99999 PR PBB SHADOW E&M-EST. PATIENT-LVL III: CPT | Mod: PBBFAC,,, | Performed by: FAMILY MEDICINE

## 2023-06-05 PROCEDURE — 99395 PREV VISIT EST AGE 18-39: CPT | Mod: S$GLB,,, | Performed by: FAMILY MEDICINE

## 2023-06-05 PROCEDURE — 3074F SYST BP LT 130 MM HG: CPT | Mod: CPTII,S$GLB,, | Performed by: FAMILY MEDICINE

## 2023-06-05 PROCEDURE — 99395 PR PREVENTIVE VISIT,EST,18-39: ICD-10-PCS | Mod: S$GLB,,, | Performed by: FAMILY MEDICINE

## 2023-06-05 PROCEDURE — 1160F PR REVIEW ALL MEDS BY PRESCRIBER/CLIN PHARMACIST DOCUMENTED: ICD-10-PCS | Mod: CPTII,S$GLB,, | Performed by: FAMILY MEDICINE

## 2023-06-05 PROCEDURE — 1159F PR MEDICATION LIST DOCUMENTED IN MEDICAL RECORD: ICD-10-PCS | Mod: CPTII,S$GLB,, | Performed by: FAMILY MEDICINE

## 2023-06-05 PROCEDURE — 1159F MED LIST DOCD IN RCRD: CPT | Mod: CPTII,S$GLB,, | Performed by: FAMILY MEDICINE

## 2023-06-05 PROCEDURE — 3008F BODY MASS INDEX DOCD: CPT | Mod: CPTII,S$GLB,, | Performed by: FAMILY MEDICINE

## 2023-06-05 PROCEDURE — 1160F RVW MEDS BY RX/DR IN RCRD: CPT | Mod: CPTII,S$GLB,, | Performed by: FAMILY MEDICINE

## 2023-06-05 PROCEDURE — 3008F PR BODY MASS INDEX (BMI) DOCUMENTED: ICD-10-PCS | Mod: CPTII,S$GLB,, | Performed by: FAMILY MEDICINE

## 2023-06-05 PROCEDURE — 99999 PR PBB SHADOW E&M-EST. PATIENT-LVL III: ICD-10-PCS | Mod: PBBFAC,,, | Performed by: FAMILY MEDICINE

## 2023-06-05 RX ORDER — METHYLPHENIDATE HYDROCHLORIDE 27 MG/1
27 TABLET ORAL DAILY
Qty: 30 TABLET | Refills: 0 | Status: SHIPPED | OUTPATIENT
Start: 2023-06-05 | End: 2023-07-13 | Stop reason: SDUPTHER

## 2023-06-05 RX ORDER — SERTRALINE HYDROCHLORIDE 50 MG/1
50 TABLET, FILM COATED ORAL DAILY
Qty: 90 TABLET | Refills: 3 | Status: SHIPPED | OUTPATIENT
Start: 2023-06-05

## 2023-06-05 RX ORDER — METHYLPHENIDATE HYDROCHLORIDE 54 MG/1
54 TABLET ORAL EVERY MORNING
Qty: 30 TABLET | Refills: 0 | Status: SHIPPED | OUTPATIENT
Start: 2023-06-05 | End: 2023-07-13 | Stop reason: SDUPTHER

## 2023-06-05 NOTE — PROGRESS NOTES
Subjective:      Patient ID: Ginger Matamoros is a 22 y.o. male.    Chief Complaint: Follow-up (3 month ADD)      Patient is a 22 y.o. male coming in today for annual exam.   ADHD is stable with Methylphenidate. Reports weight loss has been consistent since dietary changes. Currently sitting at 245 lbs. He is requiring medication refills. No other health concern at this time. Reports BP stable with medication.       1. Routine general medical examination at a health care facility    2. Attention and concentration deficit    3. MEKA (generalized anxiety disorder)    4. Depression, unspecified depression type       No questionnaires on file.    Pmh, Psh, Family Hx, Social Hx, HM updated in Epic Tabs today.   Review of Systems   Constitutional:  Negative for activity change, appetite change, chills, fatigue and unexpected weight change.   HENT:  Negative for congestion, ear pain, postnasal drip, sneezing, sore throat and trouble swallowing.    Eyes:  Negative for pain and visual disturbance.   Respiratory:  Negative for cough and shortness of breath.    Cardiovascular:  Negative for chest pain and leg swelling.   Gastrointestinal:  Negative for abdominal pain, constipation, diarrhea, nausea and vomiting.   Endocrine: Negative for cold intolerance and heat intolerance.   Genitourinary:  Negative for difficulty urinating, dysuria and flank pain.   Musculoskeletal:  Negative for arthralgias, back pain, joint swelling and neck pain.   Skin:  Negative for color change and rash.   Neurological:  Negative for dizziness, seizures and headaches.   Psychiatric/Behavioral:  Negative for behavioral problems, dysphoric mood and sleep disturbance. The patient is not nervous/anxious.    Objective:     Vitals:    06/05/23 0934   BP: 120/78   Pulse: 76   SpO2: 98%   Weight: 111.4 kg (245 lb 7.7 oz)   Height: 6' (1.829 m)     Wt Readings from Last 10 Encounters:   06/05/23 111.4 kg (245 lb 7.7 oz)   02/14/23 100.2 kg (221 lb)   03/23/22  100.4 kg (221 lb 3.7 oz)   01/29/21 79.6 kg (175 lb 7.8 oz)     Physical Exam  Vitals reviewed.   Constitutional:       General: He is not in acute distress.     Appearance: Normal appearance. He is well-developed. He is obese.   HENT:      Head: Normocephalic and atraumatic.      Right Ear: Tympanic membrane and external ear normal.      Left Ear: Tympanic membrane and external ear normal.      Nose: Nose normal.      Mouth/Throat:      Mouth: Mucous membranes are moist.      Pharynx: Oropharynx is clear.   Eyes:      Conjunctiva/sclera: Conjunctivae normal.      Pupils: Pupils are equal, round, and reactive to light.   Neck:      Thyroid: No thyromegaly.   Cardiovascular:      Rate and Rhythm: Normal rate and regular rhythm.      Heart sounds: No murmur heard.    No friction rub. No gallop.   Pulmonary:      Effort: Pulmonary effort is normal. No respiratory distress.      Breath sounds: Normal breath sounds.   Abdominal:      General: Bowel sounds are normal. There is no distension.      Palpations: Abdomen is soft.      Tenderness: There is no abdominal tenderness. There is no rebound.   Musculoskeletal:         General: Normal range of motion.      Cervical back: Normal range of motion and neck supple.   Lymphadenopathy:      Cervical: No cervical adenopathy.   Skin:     General: Skin is warm and dry.   Neurological:      General: No focal deficit present.      Mental Status: He is alert and oriented to person, place, and time.      Coordination: Coordination normal.   Psychiatric:         Attention and Perception: Attention normal.         Mood and Affect: Mood and affect normal.         Speech: Speech normal.         Behavior: Behavior normal.         Thought Content: Thought content normal.         Cognition and Memory: Cognition normal.         Judgment: Judgment normal.       Assessment:     1. Routine general medical examination at a health care facility    2. Attention and concentration deficit    3. MEKA  (generalized anxiety disorder)    4. Depression, unspecified depression type        Plan:   Ginger was seen today for follow-up.    Diagnoses and all orders for this visit:    Routine general medical examination at a health care facility  -     TSH; Future  -     Hemoglobin A1C; Future  -     Lipid Panel; Future  -     Comprehensive Metabolic Panel; Future  -     CBC Auto Differential; Future  -     T4, Free; Future  -     Insulin, Random; Future    Attention and concentration deficit  -     methylphenidate HCl 27 MG CR tablet; Take 1 tablet (27 mg total) by mouth once daily (mid-day dosing for extending dosing for long work days)  -     methylphenidate HCl 54 MG CR tablet; Take 1 tablet (54 mg total) by mouth every morning.    MEKA (generalized anxiety disorder)  -     sertraline (ZOLOFT) 50 MG tablet; Take 1 tablet (50 mg total) by mouth once daily.    Depression, unspecified depression type  -     sertraline (ZOLOFT) 50 MG tablet; Take 1 tablet (50 mg total) by mouth once daily.      - above diagnoses were discussed and reviewed today during visit. The conditions are stable.  Continue with current medications and interventions.  Refilled Rx Zoloft 50 mg/day for MEKA and depression treatment.   Refilled RX Methylphenidate 27 mg/day for ADHD treatment.   - stable, Continue with current medications and interventions.  reviewed.   Lab work ordered to be completed today.   Advised to continue with dietary changes and regular exercising.   Instructed to f/u in 3 months via telemedicine.     There are no Patient Instructions on file for this visit.    Follow up in about 3 months (around 9/5/2023) for f/u office visit Dr. Woodall.      LABS:   No results found for: HGBA1C   Lab Results   Component Value Date    CHOL 153 03/09/2022    CHOL 129 01/29/2021     Lab Results   Component Value Date    LDLCALC 67.8 03/09/2022    LDLCALC 58.6 (L) 01/29/2021     Lab Results   Component Value Date    WBC 5.40 01/29/2021    HGB 14.3  01/29/2021    HCT 43.9 01/29/2021     01/29/2021    CHOL 153 03/09/2022    TRIG 151 (H) 03/09/2022    HDL 55 03/09/2022    ALT 37 03/09/2022    AST 28 03/09/2022     03/09/2022    K 4.1 03/09/2022     03/09/2022    CREATININE 1.1 03/09/2022    BUN 8 03/09/2022    CO2 26 03/09/2022    TSH 0.478 01/29/2021       The ASCVD Risk score (Houston DK, et al., 2019) failed to calculate for the following reasons:    The 2019 ASCVD risk score is only valid for ages 40 to 79  No image results found.    Scribe Attestation:   I, Zoran Blas, am scribing for, and in the presence of, Dr. Abby Woodall MD. I performed the above scribed service and the documentation accurately describes the services I performed. I attest to the accuracy of the note.    I, Dr. Abby Woodall MD, reviewed documentation as scribed above. I personally performed the services described in this documentation.  I agree that the record reflects my personal performance and is accurate and complete. Abby Woodall MD.    06/05/2023

## 2023-07-13 DIAGNOSIS — R41.840 ATTENTION AND CONCENTRATION DEFICIT: ICD-10-CM

## 2023-07-13 RX ORDER — METHYLPHENIDATE HYDROCHLORIDE 54 MG/1
54 TABLET ORAL EVERY MORNING
Qty: 30 TABLET | Refills: 0 | Status: SHIPPED | OUTPATIENT
Start: 2023-07-13 | End: 2023-08-21 | Stop reason: SDUPTHER

## 2023-07-13 RX ORDER — METHYLPHENIDATE HYDROCHLORIDE 27 MG/1
27 TABLET ORAL DAILY
Qty: 30 TABLET | Refills: 0 | Status: SHIPPED | OUTPATIENT
Start: 2023-07-13 | End: 2023-08-22 | Stop reason: SDUPTHER

## 2023-07-13 NOTE — TELEPHONE ENCOUNTER
Refill Routing Note   Medication(s) are not appropriate for processing by [facility] for the following reason(s):      Medication outside of protocol    [facility] action(s):  Route Care Due:  None identified              Appointments  past 12m or future 3m with PCP    Date Provider   Last Visit   6/5/2023 Abby Woodall MD   Next Visit   9/6/2023 Abby Woodall MD   ED visits in past 90 days: 0        Note composed:3:18 PM 07/13/2023

## 2023-07-13 NOTE — TELEPHONE ENCOUNTER
No care due was identified.  [facility] Embedded Care Due Messages. Reference number: [id number].   7/13/2023 2:15:58 PM CDT

## 2023-08-21 DIAGNOSIS — R41.840 ATTENTION AND CONCENTRATION DEFICIT: ICD-10-CM

## 2023-08-21 RX ORDER — METHYLPHENIDATE HYDROCHLORIDE 54 MG/1
54 TABLET ORAL EVERY MORNING
Qty: 30 TABLET | Refills: 0 | Status: CANCELLED | OUTPATIENT
Start: 2023-08-21

## 2023-08-21 RX ORDER — METHYLPHENIDATE HYDROCHLORIDE 27 MG/1
27 TABLET ORAL DAILY
Qty: 30 TABLET | Refills: 0 | OUTPATIENT
Start: 2023-08-21

## 2023-08-21 RX ORDER — METHYLPHENIDATE HYDROCHLORIDE 54 MG/1
54 TABLET ORAL EVERY MORNING
Qty: 30 TABLET | Refills: 0 | Status: SHIPPED | OUTPATIENT
Start: 2023-08-21 | End: 2023-08-22 | Stop reason: SDUPTHER

## 2023-08-21 NOTE — TELEPHONE ENCOUNTER
No care due was identified.  [facility] Embedded Care Due Messages. Reference number: [id number].   8/21/2023 9:28:39 AM CDT

## 2023-08-22 DIAGNOSIS — R41.840 ATTENTION AND CONCENTRATION DEFICIT: ICD-10-CM

## 2023-08-22 RX ORDER — METHYLPHENIDATE HYDROCHLORIDE 27 MG/1
27 TABLET ORAL DAILY
Qty: 30 TABLET | Refills: 0 | Status: SHIPPED | OUTPATIENT
Start: 2023-08-22 | End: 2023-09-25 | Stop reason: SDUPTHER

## 2023-08-22 RX ORDER — METHYLPHENIDATE HYDROCHLORIDE 54 MG/1
54 TABLET ORAL EVERY MORNING
Qty: 30 TABLET | Refills: 0 | Status: SHIPPED | OUTPATIENT
Start: 2023-08-22 | End: 2023-08-29 | Stop reason: SDUPTHER

## 2023-08-22 NOTE — TELEPHONE ENCOUNTER
Ginger Matamoros,     I have sent the following medications to your preferred pharmacy on file. Please let me know if you have further questions.     Medications Ordered This Encounter   Medications    methylphenidate HCl 27 MG CR tablet     Sig: Take 1 tablet (27 mg total) by mouth once daily (mid-day dosing for extending dosing for long work days)     Dispense:  30 tablet     Refill:  0    methylphenidate HCl 54 MG CR tablet     Sig: Take 1 tablet (54 mg total) by mouth every morning.     Dispense:  30 tablet     Refill:  0     For max daily dose of 81mg of concerta        [facility] [address] [address]  Phone: [phone] Fax: [phone]       Sincerely,   Abby Woodall MD

## 2023-08-22 NOTE — TELEPHONE ENCOUNTER
No care due was identified.  [facility] Embedded Care Due Messages. Reference number: [id number].   8/22/2023 8:52:36 AM CDT

## 2023-08-29 ENCOUNTER — LAB VISIT (OUTPATIENT)
Dept: LAB | Facility: HOSPITAL | Age: 23
End: 2023-08-29
Attending: FAMILY MEDICINE
Payer: COMMERCIAL

## 2023-08-29 DIAGNOSIS — Z00.00 ROUTINE GENERAL MEDICAL EXAMINATION AT A HEALTH CARE FACILITY: ICD-10-CM

## 2023-08-29 DIAGNOSIS — R41.840 ATTENTION AND CONCENTRATION DEFICIT: ICD-10-CM

## 2023-08-29 LAB
ALBUMIN SERPL BCP-MCNC: 4.2 G/DL (ref 3.5–5.2)
ALP SERPL-CCNC: 70 U/L (ref 55–135)
ALT SERPL W/O P-5'-P-CCNC: 37 U/L (ref 10–44)
ANION GAP SERPL CALC-SCNC: 10 MMOL/L (ref 8–16)
AST SERPL-CCNC: 30 U/L (ref 10–40)
BASOPHILS # BLD AUTO: 0.02 K/UL (ref 0–0.2)
BASOPHILS NFR BLD: 0.3 % (ref 0–1.9)
BILIRUB SERPL-MCNC: 0.5 MG/DL (ref 0.1–1)
BUN SERPL-MCNC: 7 MG/DL (ref 6–20)
CALCIUM SERPL-MCNC: 9.5 MG/DL (ref 8.7–10.5)
CHLORIDE SERPL-SCNC: 105 MMOL/L (ref 95–110)
CHOLEST SERPL-MCNC: 169 MG/DL (ref 120–199)
CHOLEST/HDLC SERPL: 5 {RATIO} (ref 2–5)
CO2 SERPL-SCNC: 26 MMOL/L (ref 23–29)
CREAT SERPL-MCNC: 1 MG/DL (ref 0.5–1.4)
DIFFERENTIAL METHOD: NORMAL
EOSINOPHIL # BLD AUTO: 0.1 K/UL (ref 0–0.5)
EOSINOPHIL NFR BLD: 1.3 % (ref 0–8)
ERYTHROCYTE [DISTWIDTH] IN BLOOD BY AUTOMATED COUNT: 13.4 % (ref 11.5–14.5)
EST. GFR  (NO RACE VARIABLE): >60 ML/MIN/1.73 M^2
ESTIMATED AVG GLUCOSE: 105 MG/DL (ref 68–131)
GLUCOSE SERPL-MCNC: 104 MG/DL (ref 70–110)
HBA1C MFR BLD: 5.3 % (ref 4–5.6)
HCT VFR BLD AUTO: 45.6 % (ref 40–54)
HDLC SERPL-MCNC: 34 MG/DL (ref 40–75)
HDLC SERPL: 20.1 % (ref 20–50)
HGB BLD-MCNC: 15 G/DL (ref 14–18)
IMM GRANULOCYTES # BLD AUTO: 0.02 K/UL (ref 0–0.04)
IMM GRANULOCYTES NFR BLD AUTO: 0.3 % (ref 0–0.5)
INSULIN COLLECTION INTERVAL: NORMAL
INSULIN SERPL-ACNC: 12.6 UU/ML
LDLC SERPL CALC-MCNC: 88.4 MG/DL (ref 63–159)
LYMPHOCYTES # BLD AUTO: 3.1 K/UL (ref 1–4.8)
LYMPHOCYTES NFR BLD: 45 % (ref 18–48)
MCH RBC QN AUTO: 30.1 PG (ref 27–31)
MCHC RBC AUTO-ENTMCNC: 32.9 G/DL (ref 32–36)
MCV RBC AUTO: 91 FL (ref 82–98)
MONOCYTES # BLD AUTO: 0.7 K/UL (ref 0.3–1)
MONOCYTES NFR BLD: 10.5 % (ref 4–15)
NEUTROPHILS # BLD AUTO: 2.9 K/UL (ref 1.8–7.7)
NEUTROPHILS NFR BLD: 42.6 % (ref 38–73)
NONHDLC SERPL-MCNC: 135 MG/DL
NRBC BLD-RTO: 0 /100 WBC
PLATELET # BLD AUTO: 274 K/UL (ref 150–450)
PMV BLD AUTO: 11.1 FL (ref 9.2–12.9)
POTASSIUM SERPL-SCNC: 4.2 MMOL/L (ref 3.5–5.1)
PROT SERPL-MCNC: 7.2 G/DL (ref 6–8.4)
RBC # BLD AUTO: 4.99 M/UL (ref 4.6–6.2)
SODIUM SERPL-SCNC: 141 MMOL/L (ref 136–145)
T4 FREE SERPL-MCNC: 0.79 NG/DL (ref 0.71–1.51)
TRIGL SERPL-MCNC: 233 MG/DL (ref 30–150)
TSH SERPL DL<=0.005 MIU/L-ACNC: 1.49 UIU/ML (ref 0.4–4)
WBC # BLD AUTO: 6.84 K/UL (ref 3.9–12.7)

## 2023-08-29 PROCEDURE — 80053 COMPREHEN METABOLIC PANEL: CPT | Performed by: FAMILY MEDICINE

## 2023-08-29 PROCEDURE — 80061 LIPID PANEL: CPT | Performed by: FAMILY MEDICINE

## 2023-08-29 PROCEDURE — 84443 ASSAY THYROID STIM HORMONE: CPT | Performed by: FAMILY MEDICINE

## 2023-08-29 PROCEDURE — 83036 HEMOGLOBIN GLYCOSYLATED A1C: CPT | Performed by: FAMILY MEDICINE

## 2023-08-29 PROCEDURE — 83525 ASSAY OF INSULIN: CPT | Performed by: FAMILY MEDICINE

## 2023-08-29 PROCEDURE — 84439 ASSAY OF FREE THYROXINE: CPT | Performed by: FAMILY MEDICINE

## 2023-08-29 PROCEDURE — 85025 COMPLETE CBC W/AUTO DIFF WBC: CPT | Performed by: FAMILY MEDICINE

## 2023-08-30 RX ORDER — METHYLPHENIDATE HYDROCHLORIDE 54 MG/1
54 TABLET ORAL EVERY MORNING
Qty: 30 TABLET | Refills: 0 | Status: SHIPPED | OUTPATIENT
Start: 2023-08-30 | End: 2023-09-26 | Stop reason: SDUPTHER

## 2023-08-30 NOTE — TELEPHONE ENCOUNTER
No care due was identified.  [facility] Embedded Care Due Messages. Reference number: [id number].   8/29/2023 8:13:45 PM CDT

## 2023-09-06 ENCOUNTER — OFFICE VISIT (OUTPATIENT)
Dept: PRIMARY CARE CLINIC | Facility: CLINIC | Age: 23
End: 2023-09-06
Payer: COMMERCIAL

## 2023-09-06 VITALS
TEMPERATURE: 97 F | DIASTOLIC BLOOD PRESSURE: 68 MMHG | HEART RATE: 73 BPM | OXYGEN SATURATION: 99 % | BODY MASS INDEX: 32.86 KG/M2 | RESPIRATION RATE: 18 BRPM | WEIGHT: 242.31 LBS | SYSTOLIC BLOOD PRESSURE: 118 MMHG

## 2023-09-06 DIAGNOSIS — F32.A DEPRESSION, UNSPECIFIED DEPRESSION TYPE: ICD-10-CM

## 2023-09-06 DIAGNOSIS — E78.6 LOW HDL (UNDER 40): Primary | ICD-10-CM

## 2023-09-06 DIAGNOSIS — F41.1 GAD (GENERALIZED ANXIETY DISORDER): ICD-10-CM

## 2023-09-06 DIAGNOSIS — R41.840 ATTENTION AND CONCENTRATION DEFICIT: ICD-10-CM

## 2023-09-06 PROCEDURE — 3078F DIAST BP <80 MM HG: CPT | Mod: CPTII,S$GLB,, | Performed by: FAMILY MEDICINE

## 2023-09-06 PROCEDURE — 99214 OFFICE O/P EST MOD 30 MIN: CPT | Mod: S$GLB,,, | Performed by: FAMILY MEDICINE

## 2023-09-06 PROCEDURE — 99214 PR OFFICE/OUTPT VISIT, EST, LEVL IV, 30-39 MIN: ICD-10-PCS | Mod: S$GLB,,, | Performed by: FAMILY MEDICINE

## 2023-09-06 PROCEDURE — 3078F PR MOST RECENT DIASTOLIC BLOOD PRESSURE < 80 MM HG: ICD-10-PCS | Mod: CPTII,S$GLB,, | Performed by: FAMILY MEDICINE

## 2023-09-06 PROCEDURE — 3008F BODY MASS INDEX DOCD: CPT | Mod: CPTII,S$GLB,, | Performed by: FAMILY MEDICINE

## 2023-09-06 PROCEDURE — 1159F MED LIST DOCD IN RCRD: CPT | Mod: CPTII,S$GLB,, | Performed by: FAMILY MEDICINE

## 2023-09-06 PROCEDURE — 99999 PR PBB SHADOW E&M-EST. PATIENT-LVL III: ICD-10-PCS | Mod: PBBFAC,,, | Performed by: FAMILY MEDICINE

## 2023-09-06 PROCEDURE — 3044F PR MOST RECENT HEMOGLOBIN A1C LEVEL <7.0%: ICD-10-PCS | Mod: CPTII,S$GLB,, | Performed by: FAMILY MEDICINE

## 2023-09-06 PROCEDURE — 3008F PR BODY MASS INDEX (BMI) DOCUMENTED: ICD-10-PCS | Mod: CPTII,S$GLB,, | Performed by: FAMILY MEDICINE

## 2023-09-06 PROCEDURE — 3074F SYST BP LT 130 MM HG: CPT | Mod: CPTII,S$GLB,, | Performed by: FAMILY MEDICINE

## 2023-09-06 PROCEDURE — 3044F HG A1C LEVEL LT 7.0%: CPT | Mod: CPTII,S$GLB,, | Performed by: FAMILY MEDICINE

## 2023-09-06 PROCEDURE — 1159F PR MEDICATION LIST DOCUMENTED IN MEDICAL RECORD: ICD-10-PCS | Mod: CPTII,S$GLB,, | Performed by: FAMILY MEDICINE

## 2023-09-06 PROCEDURE — 1160F PR REVIEW ALL MEDS BY PRESCRIBER/CLIN PHARMACIST DOCUMENTED: ICD-10-PCS | Mod: CPTII,S$GLB,, | Performed by: FAMILY MEDICINE

## 2023-09-06 PROCEDURE — 99999 PR PBB SHADOW E&M-EST. PATIENT-LVL III: CPT | Mod: PBBFAC,,, | Performed by: FAMILY MEDICINE

## 2023-09-06 PROCEDURE — 3074F PR MOST RECENT SYSTOLIC BLOOD PRESSURE < 130 MM HG: ICD-10-PCS | Mod: CPTII,S$GLB,, | Performed by: FAMILY MEDICINE

## 2023-09-06 PROCEDURE — 1160F RVW MEDS BY RX/DR IN RCRD: CPT | Mod: CPTII,S$GLB,, | Performed by: FAMILY MEDICINE

## 2023-09-06 NOTE — PROGRESS NOTES
Subjective:      Patient ID: Ginger Matamoros is a 22 y.o. male.    Chief Complaint: Follow-up (Pt presents to clinic for 3 mth f/u.)      Patient is a 22 y.o. male coming in today for ADHD f/u.  Recent lab work results reviewed with pt; triglycerides were elevated. Other markers such as liver enzymes and blood glucose were within normal limits. Pt reports eating more processed foods recently. Discussed at length different exercises and dietary changes pt can do for weight management. No other health concern at this time.       1. Low HDL (under 40)    2. MEKA (generalized anxiety disorder)    3. Depression, unspecified depression type    4. Attention and concentration deficit    5. BMI 32.0-32.9,adult       No questionnaires on file.    Pmh, Psh, Family Hx, Social Hx, HM updated in Epic Tabs today.   Review of Systems   Constitutional:  Negative for activity change, appetite change, chills, fatigue and unexpected weight change.   HENT:  Negative for congestion, ear pain, postnasal drip, sneezing, sore throat and trouble swallowing.    Eyes:  Negative for pain and visual disturbance.   Respiratory:  Negative for cough and shortness of breath.    Cardiovascular:  Negative for chest pain and leg swelling.   Gastrointestinal:  Negative for abdominal pain, constipation, diarrhea, nausea and vomiting.   Endocrine: Negative for cold intolerance and heat intolerance.   Genitourinary:  Negative for difficulty urinating, dysuria and flank pain.   Musculoskeletal:  Negative for arthralgias, back pain, joint swelling and neck pain.   Skin:  Negative for color change and rash.   Neurological:  Negative for dizziness, seizures and headaches.   Psychiatric/Behavioral:  Negative for behavioral problems, dysphoric mood and sleep disturbance. The patient is not nervous/anxious.      Objective:     Vitals:    09/06/23 0809   BP: 118/68   Pulse: 73   Resp: 18   Temp: 97.3 °F (36.3 °C)   SpO2: 99%   Weight: 109.9 kg (242 lb 4.6 oz)      Wt Readings from Last 10 Encounters:   09/06/23 109.9 kg (242 lb 4.6 oz)   06/05/23 111.4 kg (245 lb 7.7 oz)   02/14/23 100.2 kg (221 lb)   03/23/22 100.4 kg (221 lb 3.7 oz)   01/29/21 79.6 kg (175 lb 7.8 oz)     Physical Exam  Vitals reviewed.   Constitutional:       General: He is not in acute distress.     Appearance: Normal appearance. He is well-developed. He is obese.   HENT:      Head: Normocephalic and atraumatic.      Right Ear: Tympanic membrane and external ear normal.      Left Ear: Tympanic membrane and external ear normal.      Nose: Nose normal.      Mouth/Throat:      Mouth: Mucous membranes are moist.      Pharynx: Oropharynx is clear.   Eyes:      Conjunctiva/sclera: Conjunctivae normal.      Pupils: Pupils are equal, round, and reactive to light.   Neck:      Thyroid: No thyromegaly.   Cardiovascular:      Rate and Rhythm: Normal rate and regular rhythm.      Heart sounds: No murmur heard.     No friction rub. No gallop.   Pulmonary:      Effort: Pulmonary effort is normal. No respiratory distress.      Breath sounds: Normal breath sounds.   Abdominal:      General: Bowel sounds are normal. There is no distension.      Palpations: Abdomen is soft.      Tenderness: There is no abdominal tenderness. There is no rebound.   Musculoskeletal:         General: Normal range of motion.      Cervical back: Normal range of motion and neck supple.   Lymphadenopathy:      Cervical: No cervical adenopathy.   Skin:     General: Skin is warm and dry.   Neurological:      General: No focal deficit present.      Mental Status: He is alert and oriented to person, place, and time.      Coordination: Coordination normal.   Psychiatric:         Attention and Perception: Attention normal.         Mood and Affect: Mood and affect normal.         Speech: Speech normal.         Behavior: Behavior normal.         Thought Content: Thought content normal.         Cognition and Memory: Cognition normal.         Judgment:  Judgment normal.         Assessment:     1. Low HDL (under 40)    2. MEKA (generalized anxiety disorder)    3. Depression, unspecified depression type    4. Attention and concentration deficit    5. BMI 32.0-32.9,adult        Plan:   Ginger was seen today for follow-up.    Diagnoses and all orders for this visit:    Low HDL (under 40)    MEKA (generalized anxiety disorder)    Depression, unspecified depression type    Attention and concentration deficit    BMI 32.0-32.9,adult      ADHD, MEKA, and depression are - stable, Continue with current medications and interventions.  reviewed.   Discussed exercise and dietary changes to increase HLD levels and to work on weight management.   Instructed to f/u in 3 months via telemedicine for ADHD f/u.     There are no Patient Instructions on file for this visit.    Follow up in about 3 months (around 12/6/2023) for f/u Telemed Dr Woodall/ adhd .      LABS:   Lab Results   Component Value Date    HGBA1C 5.3 08/29/2023      Lab Results   Component Value Date    CHOL 169 08/29/2023    CHOL 153 03/09/2022    CHOL 129 01/29/2021     Lab Results   Component Value Date    LDLCALC 88.4 08/29/2023    LDLCALC 67.8 03/09/2022    LDLCALC 58.6 (L) 01/29/2021     Lab Results   Component Value Date    WBC 6.84 08/29/2023    HGB 15.0 08/29/2023    HCT 45.6 08/29/2023     08/29/2023    CHOL 169 08/29/2023    TRIG 233 (H) 08/29/2023    HDL 34 (L) 08/29/2023    ALT 37 08/29/2023    AST 30 08/29/2023     08/29/2023    K 4.2 08/29/2023     08/29/2023    CREATININE 1.0 08/29/2023    BUN 7 08/29/2023    CO2 26 08/29/2023    TSH 1.491 08/29/2023    HGBA1C 5.3 08/29/2023           Scribe Attestation:   I, Zoran Blas, am scribing for, and in the presence of, Dr. Abby Woodall MD. I performed the above scribed service and the documentation accurately describes the services I performed. I attest to the accuracy of the note.    I, Dr. Abby Woodall MD, reviewed documentation as  scribed above. I personally performed the services described in this documentation.  I agree that the record reflects my personal performance and is accurate and complete. Abby Woodall MD.    09/06/2023

## 2023-09-25 DIAGNOSIS — R41.840 ATTENTION AND CONCENTRATION DEFICIT: ICD-10-CM

## 2023-09-25 NOTE — TELEPHONE ENCOUNTER
No care due was identified.  [facility] Embedded Care Due Messages. Reference number: [id number].   9/25/2023 2:14:59 PM CDT

## 2023-09-25 NOTE — TELEPHONE ENCOUNTER
Refill Routing Note   Medication(s) are not appropriate for processing by [facility] for the following reason(s):      Medication outside of protocol    [facility] action(s):  Route Care Due:  None identified            Appointments  past 12m or future 3m with PCP    Date Provider   Last Visit   9/6/2023 Abby Woodall MD   Next Visit   12/6/2023 Abby Woodall MD   ED visits in past 90 days: 0        Note composed:4:24 PM 09/25/2023

## 2023-09-26 RX ORDER — METHYLPHENIDATE HYDROCHLORIDE 54 MG/1
54 TABLET ORAL EVERY MORNING
Qty: 30 TABLET | Refills: 0 | Status: SHIPPED | OUTPATIENT
Start: 2023-09-26 | End: 2023-10-30 | Stop reason: SDUPTHER

## 2023-09-26 RX ORDER — METHYLPHENIDATE HYDROCHLORIDE 27 MG/1
27 TABLET ORAL DAILY
Qty: 30 TABLET | Refills: 0 | Status: SHIPPED | OUTPATIENT
Start: 2023-09-26 | End: 2023-10-30 | Stop reason: SDUPTHER

## 2023-09-26 NOTE — TELEPHONE ENCOUNTER
Ginger Matamoros,     I have sent the following medications to your preferred pharmacy on file. Please let me know if you have further questions.     Medications Ordered This Encounter   Medications    methylphenidate HCl 27 MG CR tablet     Sig: Take 1 tablet (27 mg total) by mouth once daily (mid-day dosing for extending dosing for long work days)     Dispense:  30 tablet     Refill:  0    methylphenidate HCl 54 MG CR tablet     Sig: Take 1 tablet (54 mg total) by mouth every morning.     Dispense:  30 tablet     Refill:  0     For max daily dose of 81mg of concerta          [facility] [address] [address]  Phone: [phone] Fax: [phone]       Sincerely,   Abby Woodall MD

## 2023-10-30 DIAGNOSIS — R41.840 ATTENTION AND CONCENTRATION DEFICIT: ICD-10-CM

## 2023-10-30 RX ORDER — METHYLPHENIDATE HYDROCHLORIDE 27 MG/1
27 TABLET ORAL DAILY
Qty: 30 TABLET | Refills: 0 | Status: SHIPPED | OUTPATIENT
Start: 2023-10-30 | End: 2023-11-28 | Stop reason: SDUPTHER

## 2023-10-30 RX ORDER — METHYLPHENIDATE HYDROCHLORIDE 54 MG/1
54 TABLET ORAL EVERY MORNING
Qty: 30 TABLET | Refills: 0 | Status: SHIPPED | OUTPATIENT
Start: 2023-10-30 | End: 2023-11-28 | Stop reason: SDUPTHER

## 2023-10-30 NOTE — TELEPHONE ENCOUNTER
No care due was identified.  Health [facility] Embedded Care Due Messages. Reference number: [id number].   10/30/2023 12:40:41 PM CDT

## 2023-11-28 DIAGNOSIS — R41.840 ATTENTION AND CONCENTRATION DEFICIT: ICD-10-CM

## 2023-11-28 RX ORDER — METHYLPHENIDATE HYDROCHLORIDE 27 MG/1
27 TABLET ORAL DAILY
Qty: 30 TABLET | Refills: 0 | Status: SHIPPED | OUTPATIENT
Start: 2023-11-28 | End: 2023-12-28 | Stop reason: SDUPTHER

## 2023-11-28 RX ORDER — METHYLPHENIDATE HYDROCHLORIDE 54 MG/1
54 TABLET ORAL EVERY MORNING
Qty: 30 TABLET | Refills: 0 | Status: SHIPPED | OUTPATIENT
Start: 2023-11-28 | End: 2023-12-28 | Stop reason: SDUPTHER

## 2023-11-28 NOTE — TELEPHONE ENCOUNTER
No care due was identified.  Health [facility] Embedded Care Due Messages. Reference number: [id number].   11/28/2023 9:52:27 AM CST

## 2023-12-06 ENCOUNTER — OFFICE VISIT (OUTPATIENT)
Dept: PRIMARY CARE CLINIC | Facility: CLINIC | Age: 23
End: 2023-12-06
Payer: COMMERCIAL

## 2023-12-06 VITALS — WEIGHT: 222 LBS | BODY MASS INDEX: 30.07 KG/M2 | HEIGHT: 72 IN

## 2023-12-06 DIAGNOSIS — F32.A DEPRESSION, UNSPECIFIED DEPRESSION TYPE: ICD-10-CM

## 2023-12-06 DIAGNOSIS — F41.1 GAD (GENERALIZED ANXIETY DISORDER): ICD-10-CM

## 2023-12-06 DIAGNOSIS — E78.6 LOW HDL (UNDER 40): ICD-10-CM

## 2023-12-06 DIAGNOSIS — R41.840 ATTENTION AND CONCENTRATION DEFICIT: Primary | ICD-10-CM

## 2023-12-06 PROCEDURE — 3008F BODY MASS INDEX DOCD: CPT | Mod: CPTII,95,, | Performed by: FAMILY MEDICINE

## 2023-12-06 PROCEDURE — 1160F PR REVIEW ALL MEDS BY PRESCRIBER/CLIN PHARMACIST DOCUMENTED: ICD-10-PCS | Mod: CPTII,95,, | Performed by: FAMILY MEDICINE

## 2023-12-06 PROCEDURE — 3044F PR MOST RECENT HEMOGLOBIN A1C LEVEL <7.0%: ICD-10-PCS | Mod: CPTII,95,, | Performed by: FAMILY MEDICINE

## 2023-12-06 PROCEDURE — 3044F HG A1C LEVEL LT 7.0%: CPT | Mod: CPTII,95,, | Performed by: FAMILY MEDICINE

## 2023-12-06 PROCEDURE — 99214 OFFICE O/P EST MOD 30 MIN: CPT | Mod: 95,,, | Performed by: FAMILY MEDICINE

## 2023-12-06 PROCEDURE — 3008F PR BODY MASS INDEX (BMI) DOCUMENTED: ICD-10-PCS | Mod: CPTII,95,, | Performed by: FAMILY MEDICINE

## 2023-12-06 PROCEDURE — 1159F MED LIST DOCD IN RCRD: CPT | Mod: CPTII,95,, | Performed by: FAMILY MEDICINE

## 2023-12-06 PROCEDURE — 99214 PR OFFICE/OUTPT VISIT, EST, LEVL IV, 30-39 MIN: ICD-10-PCS | Mod: 95,,, | Performed by: FAMILY MEDICINE

## 2023-12-06 PROCEDURE — 1160F RVW MEDS BY RX/DR IN RCRD: CPT | Mod: CPTII,95,, | Performed by: FAMILY MEDICINE

## 2023-12-06 PROCEDURE — 1159F PR MEDICATION LIST DOCUMENTED IN MEDICAL RECORD: ICD-10-PCS | Mod: CPTII,95,, | Performed by: FAMILY MEDICINE

## 2023-12-06 NOTE — PROGRESS NOTES
Subjective:      Patient ID: Ginger Matamoros is a 22 y.o. male.    Chief Complaint: Follow-up (3 month )    The patient location is: home  Visit type: video and audio simultaneous    Patient is a 22 y.o. male coming in today for ADHD f/u.  Reports he has been able to lose about 20 lbs with regular exercise and healthy eating habits. Reports being stable with Concerta and Wellbutrin. He is due for flu vaccine. No other health concern at this time.       [facility] Reason For Visit    12/6/2023  8:43 AM CST - Filed by Patient   What is your primary reason for visit? Other/Annual   Have you experienced any of the following:   Change in activity? Yes   Unexpected weight change? Yes   Neck pain? No   Hearing loss? No   Runny nose? No   Trouble swallowing? No   Eye discharge? No   Changes in vision? No   Chest tightness? No   Wheezing? No   Chest pain? No   Heart beating fast or racing? No   Blood in stool? No   Constipation? No   Vomiting? No   Diarrhea? No   Drinking much more than usual? No   Urinating much more than usual? No   Difficulty urinating? No   Have a sudden urge to urinate? No   Blood in the urine? No   Joint swelling? No   Joint pain? No   Headaches? No   Weakness? No   Confusion? No   Feeling depressed? No         Pmh, Psh, Family Hx, Social Hx updated in Epic Tabs today.     LABS:   Lab Results   Component Value Date    WBC 6.84 08/29/2023    HGB 15.0 08/29/2023    HCT 45.6 08/29/2023     08/29/2023    CHOL 169 08/29/2023    TRIG 233 (H) 08/29/2023    HDL 34 (L) 08/29/2023    ALT 37 08/29/2023    AST 30 08/29/2023     08/29/2023    K 4.2 08/29/2023     08/29/2023    CREATININE 1.0 08/29/2023    BUN 7 08/29/2023    CO2 26 08/29/2023    TSH 1.491 08/29/2023    HGBA1C 5.3 08/29/2023       No image results found.        Review of Systems   Constitutional:  Positive for activity change and unexpected weight change.   HENT:  Negative for hearing loss, rhinorrhea and trouble swallowing.     Eyes:  Negative for discharge and visual disturbance.   Respiratory:  Negative for chest tightness and wheezing.    Cardiovascular:  Negative for chest pain and palpitations.   Gastrointestinal:  Negative for blood in stool, constipation, diarrhea and vomiting.   Endocrine: Negative for polydipsia and polyuria.   Genitourinary:  Negative for difficulty urinating, hematuria and urgency.   Musculoskeletal:  Negative for arthralgias, joint swelling and neck pain.   Neurological:  Negative for weakness and headaches.   Psychiatric/Behavioral:  Negative for confusion and dysphoric mood.      Objective:     Vitals:    12/06/23 0923   Weight: 100.7 kg (222 lb)   Height: 6' (1.829 m)     Wt Readings from Last 10 Encounters:   12/06/23 100.7 kg (222 lb)   09/06/23 109.9 kg (242 lb 4.6 oz)   06/05/23 111.4 kg (245 lb 7.7 oz)   02/14/23 100.2 kg (221 lb)   03/23/22 100.4 kg (221 lb 3.7 oz)   01/29/21 79.6 kg (175 lb 7.8 oz)     Physical Exam  Vitals reviewed.   Constitutional:       General: He is awake. He is not in acute distress.     Appearance: Normal appearance. He is well-developed, well-groomed and normal weight. He is not ill-appearing.   HENT:      Head: Normocephalic and atraumatic.      Right Ear: External ear normal.      Left Ear: External ear normal.      Nose: Nose normal.      Mouth/Throat:      Lips: Pink.   Eyes:      Conjunctiva/sclera: Conjunctivae normal.   Pulmonary:      Effort: Pulmonary effort is normal.   Neurological:      Mental Status: He is alert.   Psychiatric:         Attention and Perception: Attention and perception normal. He is attentive.         Mood and Affect: Mood and affect normal. Mood is not anxious or depressed. Affect is not labile, blunt, angry or inappropriate.         Speech: Speech normal. He is communicative. Speech is not rapid and pressured, delayed, slurred or tangential.         Behavior: Behavior normal. Behavior is not agitated, slowed, aggressive, withdrawn,  hyperactive or combative. Behavior is cooperative.         Thought Content: Thought content normal. Thought content is not paranoid or delusional. Thought content does not include homicidal or suicidal ideation. Thought content does not include homicidal or suicidal plan.         Cognition and Memory: Cognition and memory normal. Memory is not impaired. He does not exhibit impaired recent memory or impaired remote memory.         Judgment: Judgment normal. Judgment is not impulsive or inappropriate.       Assessment:     1. Attention and concentration deficit    2. MEKA (generalized anxiety disorder)    3. Depression, unspecified depression type    4. BMI 30.0-30.9,adult    5. Low HDL (under 40)      Plan:   Ginger was seen today for follow-up.    Diagnoses and all orders for this visit:    Attention and concentration deficit    MEKA (generalized anxiety disorder)    Depression, unspecified depression type    BMI 30.0-30.9,adult    Low HDL (under 40)  -     Lipid Panel; Future      ADHD stable cont with concerta. Bp controlled. MEKA/depression are stable with medications; BMI is significantly improving. Continue with diet and exercise changes as prescribed.  reviewed.   Lab work ordered to be completed prior to next visit.   Advised to get flu vaccine at local pharmacy.   Advised to continue with regular exercise and healthy eating habits.   Instructed to f/u in 3 months for ADHD f/u.     Face to Face time with patient: 9:22 AM-9:36 AM        25   minutes of total time spent on the encounter, which includes face to face time and non-face to face time preparing to see the patient (eg, review of tests), Obtaining and/or reviewing separately obtained history, Documenting clinical information in the electronic or other health record, Independently interpreting results (not separately reported) and communicating results to the patient/family/caregiver, or Care coordination (not separately reported).     Each patient to  whom he or she provides medical services by telemedicine is:  (1) informed of the relationship between the physician and patient and the respective role of any other health care provider with respect to management of the patient; and (2) notified that he or she may decline to receive medical services by telemedicine and may withdraw from such care at any time.      Follow up in about 3 months (around 3/6/2024) for f/u Telemed Dr Woodall/ ADHD, labs for cholesterol .    Patient Instructions     12/01/2023 11/28/2023 1 Methylphenidate Er 54 Mg Tab 30.00 30 Me Lov [id number] [facility] (3696) 0  [place]   12/01/2023 11/28/2023 1 Methylphenidate Er 27 Mg Tab 30.00 30 Me Lov [id number] [facility] (3696) [place]   10/31/2023 10/30/2023 1 Methylphenidate Er 54 Mg Tab 30.00 30 Me Lov [id number] [facility] ([id number]) 0  [place]   10/31/2023 10/30/2023 1 Methylphenidate Er 27 Mg Tab 30.00 30 Me Lov [id number] [facility] ([id number]) 0  [place]       Scribe Attestation:   I, Zoran Blas, am scribing for, and in the presence of, Dr. Abby Woodall MD. I performed the above scribed service and the documentation accurately describes the services I performed. I attest to the accuracy of the note.    I, Dr. Abby Woodall MD, reviewed documentation as scribed above. I personally performed the services described in this documentation.  I agree that the record reflects my personal performance and is accurate and complete. Abby Woodall MD.    12/06/2023

## 2023-12-06 NOTE — PATIENT INSTRUCTIONS
12/01/2023 11/28/2023 1 Methylphenidate Er 54 Mg Tab 30.00 30 Me Lov [id number] [facility] (3696) [place]  [place]   12/01/2023 11/28/2023 1 Methylphenidate Er 27 Mg Tab 30.00 30 Me Lov [id number] [facility] (3696) 0  [place]   10/31/2023 10/30/2023 1 Methylphenidate Er 54 Mg Tab 30.00 30 Me Lov [id number] [facility] (3696) 0  [place]   10/31/2023 10/30/2023 1 Methylphenidate Er 27 Mg Tab 30.00 30 Me Lov [id number] [facility] (3696) 0  [place]

## 2023-12-28 DIAGNOSIS — R41.840 ATTENTION AND CONCENTRATION DEFICIT: ICD-10-CM

## 2023-12-28 RX ORDER — METHYLPHENIDATE HYDROCHLORIDE 27 MG/1
27 TABLET ORAL DAILY
Qty: 30 TABLET | Refills: 0 | Status: SHIPPED | OUTPATIENT
Start: 2023-12-28 | End: 2024-01-31 | Stop reason: SDUPTHER

## 2023-12-28 RX ORDER — METHYLPHENIDATE HYDROCHLORIDE 54 MG/1
54 TABLET ORAL EVERY MORNING
Qty: 30 TABLET | Refills: 0 | Status: SHIPPED | OUTPATIENT
Start: 2023-12-28 | End: 2024-01-31 | Stop reason: SDUPTHER

## 2023-12-28 NOTE — TELEPHONE ENCOUNTER
No care due was identified.  [facility] Embedded Care Due Messages. Reference number: [id number].   12/28/2023 2:24:41 PM CST

## 2024-01-31 DIAGNOSIS — R41.840 ATTENTION AND CONCENTRATION DEFICIT: ICD-10-CM

## 2024-01-31 RX ORDER — METHYLPHENIDATE HYDROCHLORIDE 54 MG/1
54 TABLET ORAL EVERY MORNING
Qty: 30 TABLET | Refills: 0 | Status: SHIPPED | OUTPATIENT
Start: 2024-01-31 | End: 2024-03-01 | Stop reason: SDUPTHER

## 2024-01-31 RX ORDER — METHYLPHENIDATE HYDROCHLORIDE 27 MG/1
27 TABLET ORAL DAILY
Qty: 30 TABLET | Refills: 0 | Status: SHIPPED | OUTPATIENT
Start: 2024-01-31 | End: 2024-03-01 | Stop reason: SDUPTHER

## 2024-01-31 NOTE — TELEPHONE ENCOUNTER
No care due was identified.  [facility] Embedded Care Due Messages. Reference number: [id number].   1/31/2024 4:22:28 PM CST

## 2024-02-28 ENCOUNTER — LAB VISIT (OUTPATIENT)
Dept: LAB | Facility: HOSPITAL | Age: 24
End: 2024-02-28
Attending: FAMILY MEDICINE
Payer: COMMERCIAL

## 2024-02-28 DIAGNOSIS — E78.6 LOW HDL (UNDER 40): ICD-10-CM

## 2024-02-28 LAB
CHOLEST SERPL-MCNC: 176 MG/DL (ref 120–199)
CHOLEST/HDLC SERPL: 4.3 {RATIO} (ref 2–5)
HDLC SERPL-MCNC: 41 MG/DL (ref 40–75)
HDLC SERPL: 23.3 % (ref 20–50)
LDLC SERPL CALC-MCNC: 108.4 MG/DL (ref 63–159)
NONHDLC SERPL-MCNC: 135 MG/DL
TRIGL SERPL-MCNC: 133 MG/DL (ref 30–150)

## 2024-02-28 PROCEDURE — 36415 COLL VENOUS BLD VENIPUNCTURE: CPT | Performed by: FAMILY MEDICINE

## 2024-02-28 PROCEDURE — 80061 LIPID PANEL: CPT | Performed by: FAMILY MEDICINE

## 2024-03-01 DIAGNOSIS — R41.840 ATTENTION AND CONCENTRATION DEFICIT: ICD-10-CM

## 2024-03-01 RX ORDER — METHYLPHENIDATE HYDROCHLORIDE 54 MG/1
54 TABLET ORAL EVERY MORNING
Qty: 30 TABLET | Refills: 0 | Status: SHIPPED | OUTPATIENT
Start: 2024-03-01 | End: 2024-03-31 | Stop reason: SDUPTHER

## 2024-03-01 RX ORDER — METHYLPHENIDATE HYDROCHLORIDE 27 MG/1
27 TABLET ORAL DAILY
Qty: 30 TABLET | Refills: 0 | Status: SHIPPED | OUTPATIENT
Start: 2024-03-01 | End: 2024-03-31 | Stop reason: SDUPTHER

## 2024-03-01 NOTE — TELEPHONE ENCOUNTER
No care due was identified.  Health [facility] Embedded Care Due Messages. Reference number: [id number].   3/01/2024 11:20:13 AM CST

## 2024-03-06 ENCOUNTER — OFFICE VISIT (OUTPATIENT)
Dept: PRIMARY CARE CLINIC | Facility: CLINIC | Age: 24
End: 2024-03-06
Payer: COMMERCIAL

## 2024-03-06 DIAGNOSIS — F41.1 GAD (GENERALIZED ANXIETY DISORDER): ICD-10-CM

## 2024-03-06 DIAGNOSIS — E78.6 LOW HDL (UNDER 40): ICD-10-CM

## 2024-03-06 DIAGNOSIS — R41.840 ATTENTION AND CONCENTRATION DEFICIT: Primary | ICD-10-CM

## 2024-03-06 PROCEDURE — 99214 OFFICE O/P EST MOD 30 MIN: CPT | Mod: 95,,, | Performed by: FAMILY MEDICINE

## 2024-03-06 PROCEDURE — 1159F MED LIST DOCD IN RCRD: CPT | Mod: CPTII,95,, | Performed by: FAMILY MEDICINE

## 2024-03-06 PROCEDURE — 1160F RVW MEDS BY RX/DR IN RCRD: CPT | Mod: CPTII,95,, | Performed by: FAMILY MEDICINE

## 2024-03-06 NOTE — PROGRESS NOTES
Subjective:      Patient ID: Ginger Matamoros is a 23 y.o. male.    Chief Complaint: Follow-up (Lab review 3 month check up )    The patient location is: home  Visit type: video and audio simultaneous    Patient is a 23 y.o. male coming in today for ADHD and lab work f/u.   Reports being stable with Concerta. States medication has increased in price since last visit. Previous price was $7 and now jumped to >$100 for prescription. Discussed referral for pharmacy assistance. Recent lab work results reviewed with pt. Cholesterol and triglycerides have improved since last set of labs. Has been able to lose some weight since last visit. No other health concern at this time.       [facility] Reason For Visit    3/6/2024 11:28 AM CST - Filed by Patient   What is your primary reason for visit? Other/Annual   Have you experienced any of the following:   Change in activity? No   Unexpected weight change? No   Neck pain? No   Hearing loss? No   Runny nose? No   Trouble swallowing? No   Eye discharge? No   Changes in vision? No   Chest tightness? No   Wheezing? No   Chest pain? No   Heart beating fast or racing? No   Blood in stool? No   Constipation? No   Vomiting? No   Diarrhea? No   Drinking much more than usual? No   Urinating much more than usual? No   Difficulty urinating? No   Have a sudden urge to urinate? No   Blood in the urine? No   Joint swelling? No   Joint pain? No   Headaches? No   Weakness? No   Confusion? No   Feeling depressed? No     [facility]    3/6/2024 11:31 AM CST - Filed by Patient   This questionnaire should take approximately 5 to 10 minutes to complete.  To begin, press Let's Begin and then press Continue. Let's Begin   On average, how many days per week do you engage in moderate to strenuous exercise (like a brisk walk)? 3 days   On average, how many minutes do you engage in exercise at this level? 40 min   Do you feel stress - tense, restless, nervous, or anxious, or unable to sleep at night  because your mind is troubled all the time - these days? Only a little   Do you belong to any clubs or organizations such as Taoism groups, unions, fraternal or athletic groups, or school groups? Yes   How often do you attend meetings of the clubs or organizations you belong to? 1 to 4 times per year   In a typical week, how many times do you talk on the phone with family, friends, or neighbors? Three times a week   How often do you get together with friends or relatives? Once a week   Are you , , , , never , or living with a partner? Never    How hard is it for you to pay for the very basics like food, housing, medical care, and heating? Very hard   Within the past 12 months, you worried that your food would run out before you got the money to buy more. Often true   Within the past 12 months, the food you bought just didnt last and you didnt have money to get more. Often true   In the past 12 months, has lack of transportation kept you from medical appointments or from getting medications? No   In the past 12 months, has lack of transportation kept you from meetings, work, or from getting things needed for daily living? No   How often do you have a drink containing alcohol? Monthly or less   How many drinks containing alcohol do you have on a typical day when you are drinking? 3 or 4   How often do you have six or more drinks on one occasion? Less than monthly   In the last 12 months, was there a time when you were not able to pay the mortgage or rent on time? No   In the last 12 months, how many places have you lived? (range: at least 0) 1   In the last 12 months, was there a time when you did not have a steady place to sleep or slept in a shelter (including now)? No         Pmh, Psh, Family Hx, Social Hx updated in Epic Tabs today.     LABS:   Lab Results   Component Value Date    WBC 6.84 08/29/2023    HGB 15.0 08/29/2023    HCT 45.6 08/29/2023     08/29/2023     CHOL 176 02/28/2024    TRIG 133 02/28/2024    HDL 41 02/28/2024    ALT 37 08/29/2023    AST 30 08/29/2023     08/29/2023    K 4.2 08/29/2023     08/29/2023    CREATININE 1.0 08/29/2023    BUN 7 08/29/2023    CO2 26 08/29/2023    TSH 1.491 08/29/2023    HGBA1C 5.3 08/29/2023       No image results found.        Review of Systems   Constitutional:  Negative for activity change and unexpected weight change.   HENT:  Negative for hearing loss, rhinorrhea and trouble swallowing.    Eyes:  Negative for discharge and visual disturbance.   Respiratory:  Negative for chest tightness and wheezing.    Cardiovascular:  Negative for chest pain and palpitations.   Gastrointestinal:  Negative for blood in stool, constipation, diarrhea and vomiting.   Endocrine: Negative for polydipsia and polyuria.   Genitourinary:  Negative for difficulty urinating, hematuria and urgency.   Musculoskeletal:  Negative for arthralgias, joint swelling and neck pain.   Neurological:  Negative for weakness and headaches.   Psychiatric/Behavioral:  Negative for confusion and dysphoric mood.      Objective:   There were no vitals filed for this visit.  Wt Readings from Last 10 Encounters:   12/06/23 100.7 kg (222 lb)   09/06/23 109.9 kg (242 lb 4.6 oz)   06/05/23 111.4 kg (245 lb 7.7 oz)   02/14/23 100.2 kg (221 lb)   03/23/22 100.4 kg (221 lb 3.7 oz)   01/29/21 79.6 kg (175 lb 7.8 oz)     Physical Exam  Vitals reviewed.   Constitutional:       General: He is awake. He is not in acute distress.     Appearance: Normal appearance. He is well-developed, well-groomed and normal weight. He is not ill-appearing.   HENT:      Head: Normocephalic and atraumatic.      Right Ear: External ear normal.      Left Ear: External ear normal.      Nose: Nose normal.      Mouth/Throat:      Lips: Pink.   Eyes:      Conjunctiva/sclera: Conjunctivae normal.   Pulmonary:      Effort: Pulmonary effort is normal.   Neurological:      Mental Status: He is alert.    Psychiatric:         Attention and Perception: Attention and perception normal. He is attentive.         Mood and Affect: Mood and affect normal. Mood is not anxious or depressed. Affect is not labile, blunt, angry or inappropriate.         Speech: Speech normal. He is communicative. Speech is not rapid and pressured, delayed, slurred or tangential.         Behavior: Behavior normal. Behavior is not agitated, slowed, aggressive, withdrawn, hyperactive or combative. Behavior is cooperative.         Thought Content: Thought content normal. Thought content is not paranoid or delusional. Thought content does not include homicidal or suicidal ideation. Thought content does not include homicidal or suicidal plan.         Cognition and Memory: Cognition and memory normal. Memory is not impaired. He does not exhibit impaired recent memory or impaired remote memory.         Judgment: Judgment normal. Judgment is not impulsive or inappropriate.       Assessment:     1. Attention and concentration deficit    2. MEKA (generalized anxiety disorder)    3. Low HDL (under 40)      Plan:   Ginger was seen today for follow-up.    Diagnoses and all orders for this visit:    Attention and concentration deficit    MEKA (generalized anxiety disorder)    Low HDL (under 40)      ADHD is stable with medication however concerning for change in price as this is not sustainable for patient. Referral given to pharmacy assistance advocate to check on medication price , pharmacy network and benefits, given recent price increase.   Meka and depression stable with sertraline   Cholesterol has improved since last set of labs; advised to continue with healthy eating habits and regular exercise.   Instructed to f/u in 3 months with STACIE for ADHD f/u.     Face to Face time with patient: 11:58 AM-12:12 PM        25   minutes of total time spent on the encounter, which includes face to face time and non-face to face time preparing to see the patient (eg,  review of tests), Obtaining and/or reviewing separately obtained history, Documenting clinical information in the electronic or other health record, Independently interpreting results (not separately reported) and communicating results to the patient/family/caregiver, or Care coordination (not separately reported).     Each patient to whom he or she provides medical services by telemedicine is:  (1) informed of the relationship between the physician and patient and the respective role of any other health care provider with respect to management of the patient; and (2) notified that he or she may decline to receive medical services by telemedicine and may withdraw from such care at any time.      Follow up in about 3 months (around 6/6/2024) for f/u STACIE Liam Varghese NP/ 3mo ADD , f/u Virtual Visit.    There are no Patient Instructions on file for this visit.    Scribe Attestation:   I, Zoran Blas, am scribing for, and in the presence of, Dr. Abby Woodall MD. I performed the above scribed service and the documentation accurately describes the services I performed. I attest to the accuracy of the note.    I, Dr. Abby Woodall MD, reviewed documentation as scribed above. I personally performed the services described in this documentation.  I agree that the record reflects my personal performance and is accurate and complete. Abby Woodall MD.    03/06/2024

## 2024-03-06 NOTE — Clinical Note
Can you reach out to patient because he is telling me that his concerta is now $75 for the 24mg and $48 for the 54mg dose, when prior was only $7 per Rx. The patient estimate when I look at it , shows $0 per each prescription. Can you look into this for the patient and reach out to assist him as to why the change in [place]? Thanks. Abby Woodall MD

## 2024-03-31 DIAGNOSIS — R41.840 ATTENTION AND CONCENTRATION DEFICIT: ICD-10-CM

## 2024-04-01 RX ORDER — METHYLPHENIDATE HYDROCHLORIDE 27 MG/1
27 TABLET ORAL DAILY
Qty: 30 TABLET | Refills: 0 | Status: SHIPPED | OUTPATIENT
Start: 2024-04-01 | End: 2024-04-27 | Stop reason: SDUPTHER

## 2024-04-01 RX ORDER — METHYLPHENIDATE HYDROCHLORIDE 54 MG/1
54 TABLET ORAL EVERY MORNING
Qty: 30 TABLET | Refills: 0 | Status: SHIPPED | OUTPATIENT
Start: 2024-04-01 | End: 2024-04-27 | Stop reason: SDUPTHER

## 2024-04-01 NOTE — TELEPHONE ENCOUNTER
No care due was identified.  [facility] Embedded Care Due Messages. Reference number: [id number].   3/31/2024 9:54:28 PM CDT

## 2024-04-27 DIAGNOSIS — R41.840 ATTENTION AND CONCENTRATION DEFICIT: ICD-10-CM

## 2024-04-27 NOTE — TELEPHONE ENCOUNTER
No care due was identified.  [facility] Embedded Care Due Messages. Reference number: [id number].   4/27/2024 10:11:44 AM CDT

## 2024-04-29 RX ORDER — METHYLPHENIDATE HYDROCHLORIDE 54 MG/1
54 TABLET ORAL EVERY MORNING
Qty: 30 TABLET | Refills: 0 | Status: SHIPPED | OUTPATIENT
Start: 2024-04-29 | End: 2024-05-28 | Stop reason: SDUPTHER

## 2024-04-29 RX ORDER — METHYLPHENIDATE HYDROCHLORIDE 27 MG/1
27 TABLET ORAL DAILY
Qty: 30 TABLET | Refills: 0 | Status: SHIPPED | OUTPATIENT
Start: 2024-04-29 | End: 2024-05-28 | Stop reason: SDUPTHER

## 2024-05-28 DIAGNOSIS — R41.840 ATTENTION AND CONCENTRATION DEFICIT: ICD-10-CM

## 2024-05-28 RX ORDER — METHYLPHENIDATE HYDROCHLORIDE 54 MG/1
54 TABLET ORAL EVERY MORNING
Qty: 30 TABLET | Refills: 0 | Status: SHIPPED | OUTPATIENT
Start: 2024-05-28

## 2024-05-28 RX ORDER — METHYLPHENIDATE HYDROCHLORIDE 27 MG/1
27 TABLET ORAL DAILY
Qty: 30 TABLET | Refills: 0 | Status: SHIPPED | OUTPATIENT
Start: 2024-05-28

## 2024-05-28 NOTE — TELEPHONE ENCOUNTER
No care due was identified.  Health [facility] Embedded Care Due Messages. Reference number: [id number].   5/28/2024 10:17:13 AM CDT

## 2024-06-24 ENCOUNTER — OFFICE VISIT (OUTPATIENT)
Dept: PRIMARY CARE CLINIC | Facility: CLINIC | Age: 24
End: 2024-06-24
Payer: COMMERCIAL

## 2024-06-24 VITALS — SYSTOLIC BLOOD PRESSURE: 119 MMHG | DIASTOLIC BLOOD PRESSURE: 80 MMHG

## 2024-06-24 DIAGNOSIS — E78.6 LOW HDL (UNDER 40): ICD-10-CM

## 2024-06-24 DIAGNOSIS — F32.A DEPRESSION, UNSPECIFIED DEPRESSION TYPE: ICD-10-CM

## 2024-06-24 DIAGNOSIS — R41.840 ATTENTION AND CONCENTRATION DEFICIT: Primary | ICD-10-CM

## 2024-06-24 DIAGNOSIS — Z00.00 ROUTINE GENERAL MEDICAL EXAMINATION AT A HEALTH CARE FACILITY: ICD-10-CM

## 2024-06-24 DIAGNOSIS — F41.1 GAD (GENERALIZED ANXIETY DISORDER): ICD-10-CM

## 2024-06-24 PROCEDURE — G2211 COMPLEX E/M VISIT ADD ON: HCPCS | Mod: 95,,, | Performed by: FAMILY MEDICINE

## 2024-06-24 PROCEDURE — 1160F RVW MEDS BY RX/DR IN RCRD: CPT | Mod: CPTII,95,, | Performed by: FAMILY MEDICINE

## 2024-06-24 PROCEDURE — 3074F SYST BP LT 130 MM HG: CPT | Mod: CPTII,95,, | Performed by: FAMILY MEDICINE

## 2024-06-24 PROCEDURE — 99214 OFFICE O/P EST MOD 30 MIN: CPT | Mod: 95,,, | Performed by: FAMILY MEDICINE

## 2024-06-24 PROCEDURE — 3079F DIAST BP 80-89 MM HG: CPT | Mod: CPTII,95,, | Performed by: FAMILY MEDICINE

## 2024-06-24 PROCEDURE — 1159F MED LIST DOCD IN RCRD: CPT | Mod: CPTII,95,, | Performed by: FAMILY MEDICINE

## 2024-06-24 RX ORDER — METHYLPHENIDATE HYDROCHLORIDE 27 MG/1
27 TABLET ORAL DAILY
Qty: 30 TABLET | Refills: 0 | Status: SHIPPED | OUTPATIENT
Start: 2024-06-24

## 2024-06-24 RX ORDER — METHYLPHENIDATE HYDROCHLORIDE 54 MG/1
54 TABLET ORAL EVERY MORNING
Qty: 30 TABLET | Refills: 0 | Status: SHIPPED | OUTPATIENT
Start: 2024-06-24

## 2024-06-24 RX ORDER — SERTRALINE HYDROCHLORIDE 50 MG/1
50 TABLET, FILM COATED ORAL DAILY
Qty: 90 TABLET | Refills: 3 | Status: SHIPPED | OUTPATIENT
Start: 2024-06-24

## 2024-06-24 NOTE — PROGRESS NOTES
Chief Complaint  Chief Complaint   Patient presents with    Follow-up     3 month follow up       HPI  Ginger Matamoros is a 23 y.o. male with multiple medical diagnoses as listed in the medical history and problem list that presents for  virtual visit.  Their last appointment 3/6/2024.      History of Present Illness    Patient presents today for follow up.    He reports maintaining a healthy diet by incorporating nutritious foods and limiting processed intake, though he acknowledges it can be challenging while on the road. He feels he has found a good balance and denies any issues related to weight management.    He has established a better sleep schedule and expresses feeling good overall with his current sleep habits.    He reports good blood pressure control with home readings of 119/80 which he attributes to adherence to a healthy diet and improved sleep schedule. He denies any issues related to blood pressure management.    He requests refill of Zoloft.         Pmh, Psh, Family Hx, Social Hx updated in Epic Tabs today.     Review of Systems   Constitutional:  Negative for activity change and unexpected weight change.   HENT:  Negative for hearing loss, rhinorrhea and trouble swallowing.    Eyes:  Negative for discharge and visual disturbance.   Respiratory:  Negative for chest tightness and wheezing.    Cardiovascular:  Negative for chest pain and palpitations.   Gastrointestinal:  Negative for blood in stool, constipation, diarrhea and vomiting.   Endocrine: Negative for polydipsia and polyuria.   Genitourinary:  Negative for difficulty urinating, hematuria and urgency.   Musculoskeletal:  Negative for arthralgias, joint swelling and neck pain.   Neurological:  Negative for weakness and headaches.   Psychiatric/Behavioral:  Negative for confusion and dysphoric mood.            Physical Exam  Vitals reviewed.   Constitutional:       General: He is awake. He is not in acute distress.     Appearance: Normal  appearance. He is well-developed, well-groomed and normal weight. He is not ill-appearing.   HENT:      Head: Normocephalic and atraumatic.      Right Ear: External ear normal.      Left Ear: External ear normal.      Nose: Nose normal.      Mouth/Throat:      Lips: Pink.   Eyes:      Conjunctiva/sclera: Conjunctivae normal.   Pulmonary:      Effort: Pulmonary effort is normal.   Neurological:      Mental Status: He is alert.   Psychiatric:         Attention and Perception: Attention and perception normal. He is attentive.         Mood and Affect: Mood and affect normal. Mood is not anxious or depressed. Affect is not labile, blunt, angry or inappropriate.         Speech: Speech normal. He is communicative. Speech is not rapid and pressured, delayed, slurred or tangential.         Behavior: Behavior normal. Behavior is not agitated, slowed, aggressive, withdrawn, hyperactive or combative. Behavior is cooperative.         Thought Content: Thought content normal. Thought content is not paranoid or delusional. Thought content does not include homicidal or suicidal ideation. Thought content does not include homicidal or suicidal plan.         Cognition and Memory: Cognition and memory normal. Memory is not impaired. He does not exhibit impaired recent memory or impaired remote memory.         Judgment: Judgment normal. Judgment is not impulsive or inappropriate.       Physical Exam    Vitals: BP: 119/80.           ASSESSMENT/PLAN:  1. Attention and concentration deficit  -     methylphenidate HCl 27 MG CR tablet; Take 1 tablet (27 mg total) by mouth once daily (mid-day dosing for extending dosing for long work days)  Dispense: 30 tablet; Refill: 0  -     methylphenidate HCl 54 MG CR tablet; Take 1 tablet (54 mg total) by mouth every morning.  Dispense: 30 tablet; Refill: 0  -     TSH; Future; Expected date: 09/24/2024  -     Hemoglobin A1C; Future; Expected date: 09/24/2024  -     Lipid Panel; Future; Expected date:  09/24/2024  -     Comprehensive Metabolic Panel; Future; Expected date: 09/24/2024  -     CBC Auto Differential; Future; Expected date: 09/24/2024    2. MEKA (generalized anxiety disorder)  -     sertraline (ZOLOFT) 50 MG tablet; Take 1 tablet (50 mg total) by mouth once daily.  Dispense: 90 tablet; Refill: 3  -     TSH; Future; Expected date: 09/24/2024  -     Hemoglobin A1C; Future; Expected date: 09/24/2024  -     Lipid Panel; Future; Expected date: 09/24/2024  -     Comprehensive Metabolic Panel; Future; Expected date: 09/24/2024  -     CBC Auto Differential; Future; Expected date: 09/24/2024    3. Depression, unspecified depression type  -     sertraline (ZOLOFT) 50 MG tablet; Take 1 tablet (50 mg total) by mouth once daily.  Dispense: 90 tablet; Refill: 3  -     TSH; Future; Expected date: 09/24/2024  -     Hemoglobin A1C; Future; Expected date: 09/24/2024  -     Lipid Panel; Future; Expected date: 09/24/2024  -     Comprehensive Metabolic Panel; Future; Expected date: 09/24/2024  -     CBC Auto Differential; Future; Expected date: 09/24/2024    4. Routine general medical examination at a health care facility  -     TSH; Future; Expected date: 09/24/2024  -     Hemoglobin A1C; Future; Expected date: 09/24/2024  -     Lipid Panel; Future; Expected date: 09/24/2024  -     Comprehensive Metabolic Panel; Future; Expected date: 09/24/2024  -     CBC Auto Differential; Future; Expected date: 09/24/2024    5. Low HDL (under 40)  -     TSH; Future; Expected date: 09/24/2024  -     Hemoglobin A1C; Future; Expected date: 09/24/2024  -     Lipid Panel; Future; Expected date: 09/24/2024  -     Comprehensive Metabolic Panel; Future; Expected date: 09/24/2024  -     CBC Auto Differential; Future; Expected date: 09/24/2024      Assessment & Plan    HYPERTENSION:  - Patient's blood pressure is at goal at 119/80.  - Will continue current medication regimen.  MENTAL HEALTH:  - Patient reports improved sleep schedule and healthy  diet while traveling, which is contributing to overall well-being.  - Continued Zoloft at current dose and provided 1 year refill.  GENERAL HEALTH AND FOLLOW-UP:  - Encouraged continued focus on healthy lifestyle habits.  - Labs ordered to be completed 1 week prior to next visit.  - Follow up in 3 months for next in-person visit.  - Office will schedule patient's next 3 month follow up appointment.       No image results found.    Lab Results   Component Value Date    WBC 6.84 08/29/2023    HGB 15.0 08/29/2023    HCT 45.6 08/29/2023     08/29/2023    CHOL 176 02/28/2024    TRIG 133 02/28/2024    HDL 41 02/28/2024    ALT 37 08/29/2023    AST 30 08/29/2023     08/29/2023    K 4.2 08/29/2023     08/29/2023    CREATININE 1.0 08/29/2023    BUN 7 08/29/2023    CO2 26 08/29/2023    TSH 1.491 08/29/2023    HGBA1C 5.3 08/29/2023       Follow-up: Follow up in about 3 months (around 9/24/2024) for physical with Dr GUAMAN in person .    ______________________________________________________________________    Face to Face time with patient:  9:10 AM [facility]    The patient location is:  home  The chief complaint leading to consultation is: noted   Visit type: Virtual visit with synchronous audio and video   Each patient to whom he or she provides medical services by telemedicine is:  (1) informed of the relationship between the physician and patient and the respective role of any other health care provider with respect to management of the patient; and (2) notified that he or she may decline to receive medical services by telemedicine and may withdraw from such care at any time.    I spent a total of    25   minutes face to face and non-face to face on the date of this visit.This includes time preparing to see the patient (eg, review of tests, notes), obtaining and/or reviewing additional history from an independent historian and/or outside medical records, documenting clinical information in the electronic  health record, independently interpreting results and/or communicating results to the patient/family/caregiver, or care coordinator.  Visit today included increased complexity associated with the care of the episodic problem addressed and managing the longitudinal care of the patient due to the serious and/or complex managed problem(s).    This note was generated with the assistance of ambient listening technology. Verbal consent was obtained by the patient and accompanying visitor(s) for the recording of patient appointment to facilitate this note. I attest to having reviewed and edited the generated note for accuracy, though some syntax or spelling errors may persist. Please contact the author of this note for any clarification.

## 2024-07-01 DIAGNOSIS — R41.840 ATTENTION AND CONCENTRATION DEFICIT: ICD-10-CM

## 2024-07-01 DIAGNOSIS — F32.A DEPRESSION, UNSPECIFIED DEPRESSION TYPE: ICD-10-CM

## 2024-07-01 DIAGNOSIS — F41.1 GAD (GENERALIZED ANXIETY DISORDER): ICD-10-CM

## 2024-07-01 RX ORDER — METHYLPHENIDATE HYDROCHLORIDE 54 MG/1
54 TABLET ORAL EVERY MORNING
Qty: 30 TABLET | Refills: 0 | Status: SHIPPED | OUTPATIENT
Start: 2024-07-01

## 2024-07-01 RX ORDER — METHYLPHENIDATE HYDROCHLORIDE 27 MG/1
27 TABLET ORAL DAILY
Qty: 30 TABLET | Refills: 0 | Status: SHIPPED | OUTPATIENT
Start: 2024-07-01

## 2024-07-01 RX ORDER — SERTRALINE HYDROCHLORIDE 50 MG/1
50 TABLET, FILM COATED ORAL DAILY
Qty: 90 TABLET | Refills: 3 | Status: SHIPPED | OUTPATIENT
Start: 2024-07-01

## 2024-07-01 NOTE — TELEPHONE ENCOUNTER
No care due was identified.  [facility] Embedded Care Due Messages. Reference number: [id number].   7/01/2024 10:43:11 AM CDT

## 2024-07-01 NOTE — TELEPHONE ENCOUNTER
----- Message from Andry Mojica sent at 7/1/2024 10:31 AM CDT -----  Contact: self  Pt is calling requesting all meds go to [facility] . [id number] [address] AT [address] OF  [address]  [address] [facility] [address]  Phone: [phone] Fax: [phone]

## 2024-07-29 DIAGNOSIS — R41.840 ATTENTION AND CONCENTRATION DEFICIT: ICD-10-CM

## 2024-07-29 RX ORDER — METHYLPHENIDATE HYDROCHLORIDE 54 MG/1
54 TABLET ORAL EVERY MORNING
Qty: 30 TABLET | Refills: 0 | Status: SHIPPED | OUTPATIENT
Start: 2024-07-29 | End: 2024-07-31 | Stop reason: SDUPTHER

## 2024-07-29 RX ORDER — METHYLPHENIDATE HYDROCHLORIDE 27 MG/1
27 TABLET ORAL DAILY
Qty: 30 TABLET | Refills: 0 | Status: SHIPPED | OUTPATIENT
Start: 2024-07-29

## 2024-07-29 NOTE — TELEPHONE ENCOUNTER
No care due was identified.  [facility] Embedded Care Due Messages. Reference number: [id number].   7/29/2024 12:04:47 PM CDT

## 2024-07-31 DIAGNOSIS — R41.840 ATTENTION AND CONCENTRATION DEFICIT: ICD-10-CM

## 2024-07-31 RX ORDER — METHYLPHENIDATE HYDROCHLORIDE 54 MG/1
54 TABLET ORAL EVERY MORNING
Qty: 30 TABLET | Refills: 0 | Status: SHIPPED | OUTPATIENT
Start: 2024-07-31

## 2024-07-31 NOTE — TELEPHONE ENCOUNTER
No care due was identified.  [facility] Embedded Care Due Messages. Reference number: [id number].   7/31/2024 12:47:50 PM CDT

## 2024-08-28 DIAGNOSIS — R41.840 ATTENTION AND CONCENTRATION DEFICIT: ICD-10-CM

## 2024-08-28 RX ORDER — METHYLPHENIDATE HYDROCHLORIDE 54 MG/1
54 TABLET ORAL EVERY MORNING
Qty: 30 TABLET | Refills: 0 | Status: SHIPPED | OUTPATIENT
Start: 2024-08-28

## 2024-08-28 RX ORDER — METHYLPHENIDATE HYDROCHLORIDE 27 MG/1
27 TABLET ORAL DAILY
Qty: 30 TABLET | Refills: 0 | Status: SHIPPED | OUTPATIENT
Start: 2024-08-28

## 2024-08-28 NOTE — TELEPHONE ENCOUNTER
No care due was identified.  [facility] Embedded Care Due Messages. Reference number: [id number].   8/28/2024 11:43:21 AM CDT

## 2024-09-30 DIAGNOSIS — F32.A DEPRESSION, UNSPECIFIED DEPRESSION TYPE: ICD-10-CM

## 2024-09-30 DIAGNOSIS — R41.840 ATTENTION AND CONCENTRATION DEFICIT: ICD-10-CM

## 2024-09-30 DIAGNOSIS — F41.1 GAD (GENERALIZED ANXIETY DISORDER): ICD-10-CM

## 2024-09-30 RX ORDER — METHYLPHENIDATE HYDROCHLORIDE 54 MG/1
54 TABLET ORAL EVERY MORNING
Qty: 30 TABLET | Refills: 0 | Status: SHIPPED | OUTPATIENT
Start: 2024-09-30

## 2024-09-30 RX ORDER — METHYLPHENIDATE HYDROCHLORIDE 27 MG/1
27 TABLET ORAL DAILY
Qty: 30 TABLET | Refills: 0 | Status: SHIPPED | OUTPATIENT
Start: 2024-09-30

## 2024-09-30 RX ORDER — SERTRALINE HYDROCHLORIDE 50 MG/1
50 TABLET, FILM COATED ORAL DAILY
Qty: 90 TABLET | Refills: 3 | Status: SHIPPED | OUTPATIENT
Start: 2024-09-30

## 2024-09-30 NOTE — TELEPHONE ENCOUNTER
No care due was identified.  [facility] Embedded Care Due Messages. Reference number: [id number].   9/30/2024 9:05:21 AM CDT